# Patient Record
Sex: FEMALE | Race: OTHER | URBAN - METROPOLITAN AREA
[De-identification: names, ages, dates, MRNs, and addresses within clinical notes are randomized per-mention and may not be internally consistent; named-entity substitution may affect disease eponyms.]

---

## 2022-03-23 RX ORDER — DONEPEZIL HYDROCHLORIDE 10 MG/1
10 TABLET, FILM COATED ORAL NIGHTLY
COMMUNITY

## 2022-03-23 RX ORDER — MONTELUKAST SODIUM 5 MG/1
5 TABLET, CHEWABLE ORAL NIGHTLY
COMMUNITY

## 2022-03-23 RX ORDER — CARVEDILOL 25 MG/1
25 TABLET ORAL 2 TIMES DAILY WITH MEALS
COMMUNITY

## 2022-03-23 RX ORDER — AMLODIPINE BESYLATE 5 MG/1
5 TABLET ORAL DAILY
COMMUNITY
End: 2022-08-11

## 2022-03-25 ENCOUNTER — OFFICE VISIT (OUTPATIENT)
Dept: GERIATRIC MEDICINE | Age: 82
End: 2022-03-25
Payer: MEDICARE

## 2022-03-25 DIAGNOSIS — I48.0 PAROXYSMAL ATRIAL FIBRILLATION (HCC): ICD-10-CM

## 2022-03-25 DIAGNOSIS — R53.1 WEAKNESS: ICD-10-CM

## 2022-03-25 DIAGNOSIS — E11.9 DIABETES MELLITUS WITHOUT COMPLICATION (HCC): ICD-10-CM

## 2022-03-25 DIAGNOSIS — C64.1 MALIGNANT NEOPLASM OF RIGHT KIDNEY (HCC): ICD-10-CM

## 2022-03-25 DIAGNOSIS — F01.50 VASCULAR DEMENTIA WITHOUT BEHAVIORAL DISTURBANCE (HCC): Primary | ICD-10-CM

## 2022-03-28 LAB
ANION GAP SERPL CALCULATED.3IONS-SCNC: 14 MEQ/L (ref 9–15)
BASOPHILS ABSOLUTE: 0 K/UL (ref 0–0.2)
BASOPHILS RELATIVE PERCENT: 0.5 %
BUN BLDV-MCNC: 18 MG/DL (ref 8–23)
CALCIUM SERPL-MCNC: 9.6 MG/DL (ref 8.5–9.9)
CHLORIDE BLD-SCNC: 105 MEQ/L (ref 95–107)
CO2: 20 MEQ/L (ref 20–31)
CREAT SERPL-MCNC: 0.92 MG/DL (ref 0.5–0.9)
EOSINOPHILS ABSOLUTE: 0 K/UL (ref 0–0.7)
EOSINOPHILS RELATIVE PERCENT: 0.8 %
GFR AFRICAN AMERICAN: >60
GFR NON-AFRICAN AMERICAN: 58.8
GLUCOSE BLD-MCNC: 101 MG/DL (ref 70–99)
HCT VFR BLD CALC: 44.4 % (ref 37–47)
HEMOGLOBIN: 14.9 G/DL (ref 12–16)
LYMPHOCYTES ABSOLUTE: 0.7 K/UL (ref 1–4.8)
LYMPHOCYTES RELATIVE PERCENT: 11.7 %
MCH RBC QN AUTO: 29.2 PG (ref 27–31.3)
MCHC RBC AUTO-ENTMCNC: 33.6 % (ref 33–37)
MCV RBC AUTO: 87.1 FL (ref 82–100)
MONOCYTES ABSOLUTE: 0.6 K/UL (ref 0.2–0.8)
MONOCYTES RELATIVE PERCENT: 10.2 %
NEUTROPHILS ABSOLUTE: 4.4 K/UL (ref 1.4–6.5)
NEUTROPHILS RELATIVE PERCENT: 76.8 %
PDW BLD-RTO: 17.4 % (ref 11.5–14.5)
PLATELET # BLD: 237 K/UL (ref 130–400)
POTASSIUM SERPL-SCNC: 3.7 MEQ/L (ref 3.4–4.9)
RBC # BLD: 5.1 M/UL (ref 4.2–5.4)
SODIUM BLD-SCNC: 139 MEQ/L (ref 135–144)
TSH SERPL DL<=0.05 MIU/L-ACNC: 0.17 UIU/ML (ref 0.44–3.86)
WBC # BLD: 5.7 K/UL (ref 4.8–10.8)

## 2022-03-30 ENCOUNTER — OFFICE VISIT (OUTPATIENT)
Dept: GERIATRIC MEDICINE | Age: 82
End: 2022-03-30
Payer: MEDICARE

## 2022-03-30 DIAGNOSIS — E03.1 CONGENITAL HYPOTHYROIDISM WITHOUT GOITER: ICD-10-CM

## 2022-03-30 DIAGNOSIS — M81.0 AGE-RELATED OSTEOPOROSIS WITHOUT CURRENT PATHOLOGICAL FRACTURE: ICD-10-CM

## 2022-03-30 DIAGNOSIS — C64.1 MALIGNANT NEOPLASM OF RIGHT KIDNEY (HCC): ICD-10-CM

## 2022-03-30 DIAGNOSIS — F03.90 DEMENTIA WITHOUT BEHAVIORAL DISTURBANCE, UNSPECIFIED DEMENTIA TYPE: Primary | ICD-10-CM

## 2022-03-30 DIAGNOSIS — E66.9 OBESITY (BMI 30.0-34.9): ICD-10-CM

## 2022-03-30 DIAGNOSIS — I48.0 PAROXYSMAL ATRIAL FIBRILLATION (HCC): ICD-10-CM

## 2022-03-30 DIAGNOSIS — G47.33 OSA (OBSTRUCTIVE SLEEP APNEA): ICD-10-CM

## 2022-03-30 DIAGNOSIS — E78.5 HYPERLIPIDEMIA, UNSPECIFIED HYPERLIPIDEMIA TYPE: ICD-10-CM

## 2022-03-30 DIAGNOSIS — K21.9 GASTROESOPHAGEAL REFLUX DISEASE, UNSPECIFIED WHETHER ESOPHAGITIS PRESENT: ICD-10-CM

## 2022-04-01 LAB
T3 TOTAL: 95 NG/DL (ref 60–181)
T4 TOTAL: 10.3 UG/DL (ref 4.5–10.9)

## 2022-04-11 VITALS
WEIGHT: 222 LBS | TEMPERATURE: 96.9 F | HEART RATE: 90 BPM | SYSTOLIC BLOOD PRESSURE: 166 MMHG | HEIGHT: 67 IN | RESPIRATION RATE: 19 BRPM | DIASTOLIC BLOOD PRESSURE: 87 MMHG | BODY MASS INDEX: 34.84 KG/M2 | OXYGEN SATURATION: 99 %

## 2022-04-11 PROBLEM — K21.9 GASTROESOPHAGEAL REFLUX DISEASE: Status: ACTIVE | Noted: 2022-04-11

## 2022-04-11 PROBLEM — I48.0 PAROXYSMAL ATRIAL FIBRILLATION (HCC): Status: ACTIVE | Noted: 2022-04-11

## 2022-04-11 PROBLEM — F03.90 DEMENTIA WITHOUT BEHAVIORAL DISTURBANCE (HCC): Status: ACTIVE | Noted: 2022-04-11

## 2022-04-11 PROBLEM — C64.1 MALIGNANT NEOPLASM OF RIGHT KIDNEY (HCC): Status: ACTIVE | Noted: 2022-04-11

## 2022-04-11 PROBLEM — E78.5 HYPERLIPIDEMIA: Status: ACTIVE | Noted: 2022-04-11

## 2022-04-11 PROBLEM — E66.9 OBESITY (BMI 30.0-34.9): Status: ACTIVE | Noted: 2022-04-11

## 2022-04-11 PROBLEM — G47.33 OSA (OBSTRUCTIVE SLEEP APNEA): Status: ACTIVE | Noted: 2022-04-11

## 2022-04-11 PROBLEM — E03.1 CONGENITAL HYPOTHYROIDISM WITHOUT GOITER: Status: ACTIVE | Noted: 2022-04-11

## 2022-04-11 PROBLEM — M81.0 AGE-RELATED OSTEOPOROSIS WITHOUT CURRENT PATHOLOGICAL FRACTURE: Status: ACTIVE | Noted: 2022-04-11

## 2022-04-12 NOTE — PROGRESS NOTES
Patient Name: Tamera Lawler  YOB: 1940  Medical Record Number: [id number]      History of Present Illness: This 77-year-old woman was seen for follow-up visit patient has history dementia with agitation. Patient has status post behaviors in the past.  Patient has not recent nausea vomiting patient transferred to our service functionally declining. Patient has history afibrillation diabetes degenerative disease. Patient history of hypothyroid patient has not recent nausea vomiting. Patient history of MONY clinically stable. Patient in acute psychosis. No new acute her bowel bladder habits. Also has been intimately constipated per nursing staff. Review of Systems   Unable to perform ROS: Dementia   All other systems reviewed and are negative. Review of Systems: All 14 review of systems negative other than as stated above    Social History     Tobacco Use    Smoking status: Not on file    Smokeless tobacco: Not on file   Substance Use Topics    Alcohol use: Not on file    Drug use: Not on file         No past medical history on file. No past surgical history on file. Current Outpatient Medications on File Prior to Visit   Medication Sig Dispense Refill    carvedilol (COREG) 25 MG tablet Take 25 mg by mouth 2 times daily (with meals) Indications: High Blood Pressure Disorder      donepezil (ARICEPT) 10 MG tablet Take 10 mg by mouth nightly Indications: Decline in Cognition due to a Brain Disease      apixaban (ELIQUIS) 5 MG TABS tablet Take 5 mg by mouth 2 times daily Indications: Paroxysmal Atrial Fibrillation      SITagliptin (JANUVIA) 100 MG tablet Take 100 mg by mouth daily Indications: Diabetes      amLODIPine (NORVASC) 5 MG tablet Take 5 mg by mouth daily Indications: High Blood Pressure Disorder      montelukast (SINGULAIR) 5 MG chewable tablet Take 5 mg by mouth nightly Indications:  Allergy      cyanocobalamin 1000 MCG tablet Take 1,000 mcg by mouth daily Indications: Nutritional Support       No current facility-administered medications on file prior to visit. No Known Allergies      No family history on file. Physical Exam:      Physical Exam  Vitals and nursing note reviewed. Constitutional:       Appearance: Normal appearance. She is normal weight. HENT:      Nose: Nose normal.      Mouth/Throat:      Mouth: Mucous membranes are moist.   Eyes:      Pupils: Pupils are equal, round, and reactive to light. Cardiovascular:      Rate and Rhythm: Normal rate and regular rhythm. Pulses: Normal pulses. Pulmonary:      Effort: Pulmonary effort is normal.      Breath sounds: No rhonchi. Abdominal:      General: Bowel sounds are normal.      Palpations: Abdomen is soft. Tenderness: There is no abdominal tenderness. Musculoskeletal:         General: Tenderness present. Normal range of motion. Cervical back: Normal range of motion and neck supple. Skin:     General: Skin is warm. Findings: Bruising present. Neurological:      General: No focal deficit present. Mental Status: Mental status is at baseline. Psychiatric:         Mood and Affect: Mood normal.         There were no vitals taken for this visit. .   Lab Results   Component Value Date    WBC 5.7 03/28/2022    HGB 14.9 03/28/2022    HCT 44.4 03/28/2022    MCV 87.1 03/28/2022     03/28/2022     Lab Results   Component Value Date     03/28/2022    K 3.7 03/28/2022     03/28/2022    CO2 20 03/28/2022    BUN 18 03/28/2022    CREATININE 0.92 03/28/2022    GLUCOSE 101 03/28/2022    CALCIUM 9.6 03/28/2022                ASSESSMENT:  Patient Active Problem List   Diagnosis    Dementia without behavioral disturbance (HCC)    Paroxysmal atrial fibrillation (HCC)    Malignant neoplasm of right kidney (HCC)    MONY (obstructive sleep apnea)    Obesity (BMI 30.0-34. 9)    Age-related osteoporosis without current pathological fracture    Congenital hypothyroidism without goiter    Hyperlipidemia    Gastroesophageal reflux disease         PLAN:   Diagnosis Orders   1. Vascular dementia without behavioral disturbance (HCC)     2. Paroxysmal atrial fibrillation ([facility]     3. Malignant neoplasm of right kidney ([address]     4. Weakness     5. Diabetes mellitus without complication ([facility] [address]         Continue with donepezil. Consider with Namenda. Patient has been stable has been on a anticoagulation is on beta-blocker is on Eliquis, no new bleeding\continue reorientation as able. May conside attritional reassessment. Follow-up A1c CBC. Pending.

## 2022-04-12 NOTE — PROGRESS NOTES
[facility]    Assisted living apartment    Subjective:  Admission H & P    3/30/2022    Patient ID: Mervin Lugo is a 80 y.o. female being seen today for:   Chief Complaint   Patient presents with    Other     New admissionH&P       HPI patient being seen today for follow-up after recent admission to this facility with primary diagnosis of dementia without behaviors, proximal A. fib, malignant neoplasm right kidney, MONY, obesity, age-related osteoporosis without current pathological fracture, congenital hypothyroidism, hyperlipidemia and GERD. Needs assist/cues with decisions  Needs assist with ADLs  Needs assist with IADLs  + Falls risk    No past medical history on file. No past surgical history on file. No family history on file. Social History     Socioeconomic History    Marital status:      Spouse name: Not on file    Number of children: Not on file    Years of education: Not on file    Highest education level: Not on file   Occupational History    Not on file   Tobacco Use    Smoking status: Not on file    Smokeless tobacco: Not on file   Substance and Sexual Activity    Alcohol use: Not on file    Drug use: Not on file    Sexual activity: Not on file   Other Topics Concern    Not on file   Social History Narrative    Not on file     Social Determinants of Health     Financial Resource Strain:     Difficulty of Paying Living Expenses: Not on file   Food Insecurity:     Worried About Running Out of Food in the Last Year: Not on file    Stephany of Food in the Last Year: Not on file   Transportation Needs:     Lack of Transportation (Medical): Not on file    Lack of Transportation (Non-Medical):  Not on file   Physical Activity:     Days of Exercise per Week: Not on file    Minutes of Exercise per Session: Not on file   Stress:     Feeling of Stress : Not on file   Social Connections:     Frequency of Communication with Friends and Family: Not on file  Frequency of Social Gatherings with Friends and Family: Not on file    Attends Tenriism Services: Not on file    Active Member of Clubs or Organizations: Not on file    Attends Club or Organization Meetings: Not on file    Marital Status: Not on file   Intimate Partner Violence:     Fear of Current or Ex-Partner: Not on file    Emotionally Abused: Not on file    Physically Abused: Not on file    Sexually Abused: Not on file   Housing Stability:     Unable to Pay for Housing in the Last Year: Not on file    Number of [place] in the Last Year: Not on file    Unstable Housing in the Last Year: Not on file       Allergies: Patient has no known allergies. Current Outpatient Medications on File Prior to Visit   Medication Sig Dispense Refill    carvedilol (COREG) 25 MG tablet Take 25 mg by mouth 2 times daily (with meals) Indications: High Blood Pressure Disorder      donepezil (ARICEPT) 10 MG tablet Take 10 mg by mouth nightly Indications: Decline in Cognition due to a Brain Disease      apixaban (ELIQUIS) 5 MG TABS tablet Take 5 mg by mouth 2 times daily Indications: Paroxysmal Atrial Fibrillation      SITagliptin (JANUVIA) 100 MG tablet Take 100 mg by mouth daily Indications: Diabetes      amLODIPine (NORVASC) 5 MG tablet Take 5 mg by mouth daily Indications: High Blood Pressure Disorder      montelukast (SINGULAIR) 5 MG chewable tablet Take 5 mg by mouth nightly Indications: Allergy      cyanocobalamin 1000 MCG tablet Take 1,000 mcg by mouth daily Indications: Nutritional Support       No current facility-administered medications on file prior to visit. Review of Systems  Denies any headache, dizziness, blurred vision. Denies chest pain, shortness of breath, cough. Denies fever, chills, or shortness of breath. Denies rashes, bruises or open areas. Denies abdominal pain, nausea, vomiting. Denies urinary urgency ,frequency or burning. Denies diarrhea or constipation.   Reports she is eating well, sleeping well, and currently has no pain.  :   BP (!) 166/87   Pulse 90   Temp 96.9 °F (36.1 °C)   Resp 19   Ht 5' 7\" (1.702 m)   Wt 222 lb (100.7 kg)   SpO2 99%   BMI 34.77 kg/m²     Physical Exam  Constitutional: Alert elderly female no apparent distress. HEENT: Normocephalic, atraumatic, conjunctiva pink, sclera nonicteric. External ears within normal limits. Slightly hard of hearing. MMM, pink, no oropharyngeal exudate. Neck: Supple. No cervical or clavicular lymphadenopathy. No masses noted. Respiratory: LS CTA. Respirations even and unlabored. No use of accessory muscles with breathing. Cardiovascular: Heart RRR, no MGR. Abdomen: Soft, NT, ND, + BS. No masses noted. Musculoskeletal: No muscle pain. No joint tenderness. Peripheral vascular: PPP, NP edema BLE  Integumentary: Pink, warm, dry. No visible rashes, bruises or open areas. Neuropsych: Mood stable. Affect pleasant. Assessment:       Diagnosis Orders   1. Dementia without behavioral disturbance, unspecified dementia type (HCC)     2. Paroxysmal atrial fibrillation ([address]     3. Malignant neoplasm of right kidney ([address]     4. MONY (obstructive sleep apnea)     5. Obesity (BMI 30.0-34.9)     6. Age-related osteoporosis without current pathological fracture     7. Congenital hypothyroidism without goiter     8. Hyperlipidemia, unspecified hyperlipidemia type     9. Gastroesophageal reflux disease, unspecified whether esophagitis present           Plan:       Return in about 3 months (around 6/30/2022) for chronic conditions. Pt/POA agrees to POC    Pertinent POC, medications, labs, have been reviewed, continue same. Encourage fluids and good nutrition. Stress fall prevention strategies. Chapo Padgett.  Ian MSN, APRN, CNP  Adult Nurse Practitioner  [facility]  Department of Geriatrics  8:30am-4:30pm   [phone]  After hours Answering service [phone]      Please note this report is partially produced by using speech recognition hardware. It may contain errors related to the system, including grammar, punctuation and spelling as well as words and phrases that may seem inaccurate.   For any questions or concerns feel free to contact me for clarification

## 2022-04-21 LAB
BILIRUBIN URINE: NEGATIVE
BLOOD, URINE: NEGATIVE
CLARITY: CLEAR
COLOR: YELLOW
GLUCOSE URINE: NEGATIVE MG/DL
KETONES, URINE: NEGATIVE MG/DL
LEUKOCYTE ESTERASE, URINE: NEGATIVE
NITRITE, URINE: NEGATIVE
PH UA: 5.5 (ref 5–9)
PROTEIN UA: ABNORMAL MG/DL
SPECIFIC GRAVITY UA: 1.02 (ref 1–1.03)
UROBILINOGEN, URINE: 1 E.U./DL

## 2022-04-22 LAB — URINE CULTURE, ROUTINE: NORMAL

## 2022-04-28 ENCOUNTER — OFFICE VISIT (OUTPATIENT)
Dept: GERIATRIC MEDICINE | Age: 82
End: 2022-04-28
Payer: MEDICARE

## 2022-04-28 DIAGNOSIS — K43.9 ABDOMINAL WALL HERNIA: Primary | ICD-10-CM

## 2022-05-19 ENCOUNTER — OFFICE VISIT (OUTPATIENT)
Dept: GERIATRIC MEDICINE | Age: 82
End: 2022-05-19
Payer: MEDICARE

## 2022-05-19 DIAGNOSIS — I48.0 PAROXYSMAL ATRIAL FIBRILLATION (HCC): ICD-10-CM
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
## 2022-05-19 PROCEDURE — 1123F ACP DISCUSS/DSCN MKR DOCD: CPT | Performed by: NURSE PRACTITIONER

## 2022-05-21 PROBLEM — K43.9 ABDOMINAL WALL HERNIA: Status: ACTIVE | Noted: 2022-05-21

## 2022-05-21 NOTE — PROGRESS NOTES
[facility]  4/28/2022    Rao Douglass  is a 80 y.o. in the  being seen for a acute visit for   Chief Complaint   Patient presents with    Abdominal Pain       HPI Patient being seen today for acute visit for abdominal wall lump that causes her occasional pain. They are eating and drinking at their baseline per nursing. They have had no recent falls with injuries. They are not complaining of any un addressed pain issues. Have had no recent choking or dysphagia issues. NO agitation or unusual mental behavioral issues. No past medical history on file. No past surgical history on file. No family history on file. Social History     Socioeconomic History    Marital status:      Spouse name: Not on file    Number of children: Not on file    Years of education: Not on file    Highest education level: Not on file   Occupational History    Not on file   Tobacco Use    Smoking status: Not on file    Smokeless tobacco: Not on file   Substance and Sexual Activity    Alcohol use: Not on file    Drug use: Not on file    Sexual activity: Not on file   Other Topics Concern    Not on file   Social History Narrative    Not on file     Social Determinants of Health     Financial Resource Strain:     Difficulty of Paying Living Expenses: Not on file   Food Insecurity:     Worried About Running Out of Food in the Last Year: Not on file    Stephany of Food in the Last Year: Not on file   Transportation Needs:     Lack of Transportation (Medical): Not on file    Lack of Transportation (Non-Medical):  Not on file   Physical Activity:     Days of Exercise per Week: Not on file    Minutes of Exercise per Session: Not on file   Stress:     Feeling of Stress : Not on file   Social Connections:     Frequency of Communication with Friends and Family: Not on file    Frequency of Social Gatherings with Friends and Family: Not on file    Attends Caodaism Services: Not on file   [employer] Active Member of Clubs or Organizations: Not on file    Attends Club or Organization Meetings: Not on file    Marital Status: Not on file   Intimate Partner Violence:     Fear of Current or Ex-Partner: Not on file    Emotionally Abused: Not on file    Physically Abused: Not on file    Sexually Abused: Not on file   Housing Stability:     Unable to Pay for Housing in the Last Year: Not on file    Number of [place] in the Last Year: Not on file    Unstable Housing in the Last Year: Not on file       Allergies: Patient has no known allergies. NF MEDICATIONS REVIEWED    ROS:   Constitutional: There are no reports of behavioral issues, change in appetite, fever, or increased weakness. No bleeding issues. Respiratory: denies SOB, dyspnea, dyspnea on exertion  Cardiovascular: denies CP, lightheadedness, palpitations, PND, orthopnea, or claudication. GI: No N/V/D. Complains of abdominal mass right abdominal wall. Reports she has had for a very long time. Reports occasional pain in this area. : no reports of dysuria, frequency or urgency  Extremities: No reports of pain issues, edema  Psych: at baseline confusion     Physical exam:   /85, temperature 97.4, heart rate 78, respirations 18, pulse ox 99% room air, weight 222 pounds. Constitutional: Awake and alert, in no apparent distress  HEENT: Normocephalic, hard of hearing,intact facial symmetry, conjunctiva pink, sclera non icteric,  buccal mucosa pink and moist  Speech clear. NECK: - no cervical or clavicular lymphadenopathy, euthyroid, no mass visualized  Cardiovascular: Regular rate, and rhythm. No murmurs, gallops or rubs noted. Respiratory: LCTA, even unlabored respirations, no accessory muscle use noted  GI: abdomen NT, +BS x 4 Q, ND. Abdominal wall hernia, soft, nontender, no erythema. : incontinent of urine  Extremities: no ankle edema, PPP  SKELETAL: no redness, or swelling over joints.  Limited ROM but spontaneous movement x 4   Psych: pleasantly confused, repetitive , needs occasional redirection, good judgement, normal thought content  SKIN: no gross skin lesions noted on visualized skin, warm and dry    ASSESSMENT:     Diagnosis Orders   1. Abdominal wall hernia         PLAN:  Pt/POA agrees with POC   Abdominal ultrasound in the a.m. Please note this report is partially produced by using speech recognition hardware. It may contain errors related to the system, including grammar, punctuation and spelling as well as words and phrases that may seem inaccurate. For any questions or concerns feel free to contact me for clarification           ________________________    Kareem Troy.  Nirmal Lemus Banner, [address], [address] [place] [address]  Office [phone]  Fax [phone]
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
## 2022-05-31 ENCOUNTER — OFFICE VISIT (OUTPATIENT)
Dept: GERIATRIC MEDICINE | Age: 82
End: 2022-05-31
Payer: MEDICARE

## 2022-05-31 DIAGNOSIS — R60.0 EDEMA OF BOTH LOWER EXTREMITIES: Primary | ICD-10-CM

## 2022-05-31 PROCEDURE — 1123F ACP DISCUSS/DSCN MKR DOCD: CPT | Performed by: NURSE PRACTITIONER

## 2022-05-31 NOTE — PROGRESS NOTES
[facility]  5/19/2022    Valeria Ambriz  is a 80 y.o. in the  being seen for a acute visit for   Chief Complaint   Patient presents with    Fall     5/13/22 with no injury    Atrial Fibrillation       HPI patient being seen today for acute visit for fall on 5/13/2022 with no injury and A. fib. Patient is complaining of heart palpitations and is concerned stating that this is new and she can feel them. She does not believe she has a history of A. fib but she states she is never felt her heart do this in the past.  Patient reports that she slid out of bed and this is what caused her fall. She denies hitting her head she denies any injuries as a result of the fall she states she is fine. They are eating and drinking at their baseline per nursing. They are not complaining of any un addressed pain issues. Have had no recent choking or dysphagia issues. NO agitation or unusual mental behavioral issues. No past medical history on file. No past surgical history on file. No family history on file. Social History     Socioeconomic History    Marital status:      Spouse name: Not on file    Number of children: Not on file    Years of education: Not on file    Highest education level: Not on file   Occupational History    Not on file   Tobacco Use    Smoking status: Not on file    Smokeless tobacco: Not on file   Substance and Sexual Activity    Alcohol use: Not on file    Drug use: Not on file    Sexual activity: Not on file   Other Topics Concern    Not on file   Social History Narrative    Not on file     Social Determinants of Health     Financial Resource Strain:     Difficulty of Paying Living Expenses: Not on file   Food Insecurity:     Worried About Running Out of Food in the Last Year: Not on file    Stephany of Food in the Last Year: Not on file   Transportation Needs:     Lack of Transportation (Medical):  Not on file    Lack of Transportation (Non-Medical): Not on file   Physical Activity:     Days of Exercise per Week: Not on file    Minutes of Exercise per Session: Not on file   Stress:     Feeling of Stress : Not on file   Social Connections:     Frequency of Communication with Friends and Family: Not on file    Frequency of Social Gatherings with Friends and Family: Not on file    Attends Synagogue Services: Not on file    Active Member of [address] [place] [address] or Organizations: Not on file    Attends Club or Organization Meetings: Not on file    Marital Status: Not on file   Intimate Partner Violence:     Fear of Current or Ex-Partner: Not on file    Emotionally Abused: Not on file    Physically Abused: Not on file    Sexually Abused: Not on file   Housing Stability:     Unable to Pay for Housing in the Last Year: Not on file    Number of [place] in the Last Year: Not on file    Unstable Housing in the Last Year: Not on file       Allergies: Patient has no known allergies. NF MEDICATIONS REVIEWED    ROS:   Constitutional: There are no reports of behavioral issues, change in appetite, fever, or increased weakness. No bleeding issues. Respiratory: denies SOB, dyspnea, dyspnea on exertion  Cardiovascular: denies CP, lightheadedness. Reports heart palpitations which she states she has never had before. No PND, orthopnea, or claudication. GI: No N/V/D. : no reports of dysuria, frequency or urgency  Extremities: No reports of pain issues, edema  Psych: at baseline confusion     Physical exam:   Blood pressure 141/78, heart rate 68 and regular, respirations 19. Constitutional: Awake and alert sitting up in recliner in common area, general weakness  HEENT: Normocephalic, hard of hearing,intact facial symmetry, conjunctiva pink, sclera non icteric,  buccal mucosa pink and moist  Speech clear. NECK: - no cervical or clavicular lymphadenopathy, euthyroid, no mass visualized  Cardiovascular: Regular rate, and rhythm.  No murmurs, gallops or rubs noted.  Respiratory: LCTA, even unlabored respirations, no accessory muscle use noted  GI: abdomen NT, +BS x 4 Q, ND  : incontinent of urine  Extremities: no ankle edema, PPP  SKELETAL: no redness, or swelling over joints. Limited ROM but spontaneous movement x 4   Psych: pleasantly confused, good judgement, normal thought content  SKIN: no gross skin lesions noted on visualized skin, warm and dry    ASSESSMENT:     Diagnosis Orders   1. Fall, initial encounter     2. Paroxysmal atrial fibrillation (HCC)         PLAN:  Pt/POA agrees with POC   Patient has not experienced an injury as a result of his fall. She did slide out of bed. She denies hitting her head, or sustaining an injury as a result of her fall. Nursing staff report neurochecks are within normal limits and vital signs are stable. Patient is complaining of heart palpitations which she reports she has never had in the past.  She does not believe that she has A. fib that she can recall. EKG in a.m. Patient does have a history of A. fib. However patient reports she has never felt heart palpitations in the past and they scare her. I have advised the patient to report her heart palpitations to nursing staff next time they occur. Please note this report is partially produced by using speech recognition hardware. It may contain errors related to the system, including grammar, punctuation and spelling as well as words and phrases that may seem inaccurate. For any questions or concerns feel free to contact me for clarification         ________________________    Chapo Joseph Current APRN, [address]  Office [phone]  Fax [phone]

## 2022-06-03 LAB
ANION GAP SERPL CALCULATED.3IONS-SCNC: 12 MEQ/L (ref 9–15)
BUN BLDV-MCNC: 15 MG/DL (ref 8–23)
CALCIUM SERPL-MCNC: 8.5 MG/DL (ref 8.5–9.9)
CHLORIDE BLD-SCNC: 103 MEQ/L (ref 95–107)
CO2: 22 MEQ/L (ref 20–31)
CREAT SERPL-MCNC: 0.81 MG/DL (ref 0.5–0.9)
GFR AFRICAN AMERICAN: >60
GFR NON-AFRICAN AMERICAN: >60
GLUCOSE BLD-MCNC: 152 MG/DL (ref 70–99)
POTASSIUM SERPL-SCNC: 4.2 MEQ/L (ref 3.4–4.9)
SODIUM BLD-SCNC: 137 MEQ/L (ref 135–144)

## 2022-06-10 PROBLEM — R60.0 EDEMA OF BOTH LOWER EXTREMITIES: Status: ACTIVE | Noted: 2022-06-10

## 2022-06-10 NOTE — PROGRESS NOTES
[facility]  5/31/2022    Rao Douglass  is a 80 y.o. in the  being seen for a acute visit for   Chief Complaint   Patient presents with    Leg Swelling       HPI Patient being seen today for edema bilateral lower extremities. They are eating and drinking at their baseline per nursing. They have had no recent falls with injuries. They are not complaining of any un addressed pain issues. Have had no recent choking or dysphagia issues. NO agitation or unusual mental behavioral issues. No past medical history on file. No past surgical history on file. No family history on file. Social History     Socioeconomic History    Marital status:      Spouse name: Not on file    Number of children: Not on file    Years of education: Not on file    Highest education level: Not on file   Occupational History    Not on file   Tobacco Use    Smoking status: Not on file    Smokeless tobacco: Not on file   Substance and Sexual Activity    Alcohol use: Not on file    Drug use: Not on file    Sexual activity: Not on file   Other Topics Concern    Not on file   Social History Narrative    Not on file     Social Determinants of Health     Financial Resource Strain:     Difficulty of Paying Living Expenses: Not on file   Food Insecurity:     Worried About Running Out of Food in the Last Year: Not on file    Stephany of Food in the Last Year: Not on file   Transportation Needs:     Lack of Transportation (Medical): Not on file    Lack of Transportation (Non-Medical):  Not on file   Physical Activity:     Days of Exercise per Week: Not on file    Minutes of Exercise per Session: Not on file   Stress:     Feeling of Stress : Not on file   Social Connections:     Frequency of Communication with Friends and Family: Not on file    Frequency of Social Gatherings with Friends and Family: Not on file    Attends Church Services: Not on file    Active Member of Clubs or Organizations: Diagnosis Orders   1. Edema of both lower extremities         PLAN:  Pt/POA agrees with POC   Lasix 20mg po qd x5 days  Elevate legs when seated  Noncompliant with FLOWER hose  Tubigrips, knee high, on in the am off in the pm    Please note this report is partially produced by using speech recognition hardware. It may contain errors related to the system, including grammar, punctuation and spelling as well as words and phrases that may seem inaccurate. For any questions or concerns feel free to contact me for clarification         ________________________    Aiyana Alexander.  Parker Goldsmith [facility], [address], [address] [place] [address]  Office [phone]  Fax [phone]

## 2022-06-20 LAB
ANION GAP SERPL CALCULATED.3IONS-SCNC: 10 MEQ/L (ref 9–15)
BUN BLDV-MCNC: 8 MG/DL (ref 8–23)
CALCIUM SERPL-MCNC: 8.9 MG/DL (ref 8.5–9.9)
CHLORIDE BLD-SCNC: 102 MEQ/L (ref 95–107)
CO2: 24 MEQ/L (ref 20–31)
CREAT SERPL-MCNC: 0.83 MG/DL (ref 0.5–0.9)
GFR AFRICAN AMERICAN: >60
GFR NON-AFRICAN AMERICAN: >60
GLUCOSE BLD-MCNC: 92 MG/DL (ref 70–99)
POTASSIUM SERPL-SCNC: 3.7 MEQ/L (ref 3.4–4.9)
SODIUM BLD-SCNC: 136 MEQ/L (ref 135–144)
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
## 2022-07-05 ENCOUNTER — OFFICE VISIT (OUTPATIENT)
Dept: SURGERY | Age: 82
End: 2022-07-05
Payer: MEDICARE

## 2022-07-05 VITALS
HEIGHT: 67 IN | HEART RATE: 84 BPM | WEIGHT: 222 LBS | BODY MASS INDEX: 34.84 KG/M2 | OXYGEN SATURATION: 97 % | TEMPERATURE: 97.4 F

## 2022-07-05 DIAGNOSIS — K43.9 VENTRAL HERNIA WITHOUT OBSTRUCTION OR GANGRENE: Primary | ICD-10-CM

## 2022-07-05 PROCEDURE — 99203 OFFICE O/P NEW LOW 30 MIN: CPT | Performed by: COLON & RECTAL SURGERY

## 2022-07-05 PROCEDURE — 1123F ACP DISCUSS/DSCN MKR DOCD: CPT | Performed by: COLON & RECTAL SURGERY

## 2022-07-05 ASSESSMENT — ENCOUNTER SYMPTOMS
VOMITING: 0
COLOR CHANGE: 0
SHORTNESS OF BREATH: 0
CONSTIPATION: 0
ABDOMINAL PAIN: 0
DIARRHEA: 0

## 2022-07-05 NOTE — PROGRESS NOTES
Subjective:      Patient ID: Anita Opitz is a 80 y.o. female who presents for:  Chief Complaint   Patient presents with    Hernia       This is a 30-year-old female with underlying dementia who is here for an abdominal wall hernia. She is currently a resident at [facility] with her . She complains of an asymptomatic bulge in the right upper quadrant. She had a right upper quadrant ultrasound which I reviewed which showed a hernia present. Her son is present with parents for further elaboration. Patient denies any abdominal operations    She denies nausea vomiting. She reports her bowel function is good and she denies fever. History reviewed. No pertinent past medical history. History reviewed. No pertinent surgical history. Social History     Socioeconomic History    Marital status:      Spouse name: Not on file    Number of children: Not on file    Years of education: Not on file    Highest education level: Not on file   Occupational History    Not on file   Tobacco Use    Smoking status: Former Smoker    Smokeless tobacco: Former User   Substance and Sexual Activity    Alcohol use: Not Currently    Drug use: Not on file    Sexual activity: Not on file   Other Topics Concern    Not on file   Social History Narrative    Not on file     Social Determinants of Health     Financial Resource Strain:     Difficulty of Paying Living Expenses: Not on file   Food Insecurity:     Worried About [address] in the Last Year: Not on file    [address] in the Last Year: Not on file   Transportation Needs:     Lack of Transportation (Medical): Not on file    Lack of Transportation (Non-Medical):  Not on file   Physical Activity:     Days of Exercise per Week: Not on file    Minutes of Exercise per Session: Not on file   Stress:     Feeling of Stress : Not on file   Social Connections:     Frequency of Communication with Friends and Family: Not on file  Frequency of Social Gatherings with Friends and Family: Not on file    Attends Cheondo[employer] Services: Not on file    Active Member of Clubs or Organizations: Not on file    Attends Club or Organization Meetings: Not on file    Marital Status: Not on file   Intimate Partner Violence:     Fear of Current or Ex-Partner: Not on file    Emotionally Abused: Not on file    Physically Abused: Not on file    Sexually Abused: Not on file   Housing Stability:     Unable to Pay for Housing in the Last Year: Not on file    Number of [place] in the Last Year: Not on file    Unstable Housing in the Last Year: Not on file     History reviewed. No pertinent family history. Allergies:  Patient has no known allergies. Review of Systems   Constitutional: Positive for activity change. Negative for fever and unexpected weight change. HENT: Negative for congestion. Respiratory: Negative for shortness of breath. Cardiovascular: Negative for chest pain. Gastrointestinal: Negative for abdominal pain, constipation, diarrhea and vomiting. Genitourinary: Negative for difficulty urinating. Musculoskeletal: Positive for gait problem. Negative for arthralgias. Skin: Negative for color change. Neurological: Negative for dizziness. Hematological: Does not bruise/bleed easily. Psychiatric/Behavioral: Positive for decreased concentration. Negative for agitation. Objective:    Pulse 84   Temp 97.4 °F (36.3 °C) (Temporal)   Ht 5' 7\" (1.702 m)   Wt 222 lb (100.7 kg)   SpO2 97%   BMI 34.77 kg/m²     Physical Exam  Constitutional:       Appearance: She is well-developed. HENT:      Head: Normocephalic and atraumatic. Eyes:      Pupils: Pupils are equal, round, and reactive to light. Cardiovascular:      Rate and Rhythm: Normal rate and regular rhythm. Heart sounds: Normal heart sounds. Pulmonary:      Effort: Pulmonary effort is normal. No respiratory distress.       Breath sounds: Normal breath sounds. No wheezing. Abdominal:      General: There is no distension. Palpations: There is no mass. Tenderness: There is no abdominal tenderness. There is no guarding or rebound. Hernia: A hernia is present. Comments: Reducible hernia right upper quadrant. No skin changes    No pain on reduction   Musculoskeletal:         General: Normal range of motion. Cervical back: Normal range of motion and neck supple. Skin:     General: Skin is warm and dry. Coloration: Skin is not pale. Findings: No erythema or rash. Neurological:      Mental Status: She is alert and oriented to person, place, and time. Psychiatric:         Behavior: Behavior normal.         Judgment: Judgment normal.              Assessment/Plan:          Diagnosis Orders   1. Ventral hernia without obstruction or gangrene       I reviewed the abdominal ultrasound    I discussed the risks and benefits of operative repair of this hernia. Risks of infection, bleeding, recurrence postoperative pain and aggravation or worsening of dementia discussed. Nonoperative alternatives were given because of its asymptomatic nature. The patient as well as the son wish to proceed with nonoperative treatment. If this hernia were to become symptomatic, I be happy to discuss again the risks and benefits of repair. Currently for now, we will proceed with nonoperative treatment. All in agreement. Please note this report has beenpartially produced using speech recognition software and may cause contain errors related to that system including grammar, punctuation and spelling as well as words and phrases that may seem inappropriate.  If there arequestions or concerns please feel free to contact me to clarify

## 2022-07-14 ENCOUNTER — OFFICE VISIT (OUTPATIENT)
Dept: GERIATRIC MEDICINE | Age: 82
End: 2022-07-14
Payer: MEDICARE

## 2022-07-14 VITALS
BODY MASS INDEX: 35.19 KG/M2 | TEMPERATURE: 98.4 F | HEART RATE: 72 BPM | DIASTOLIC BLOOD PRESSURE: 70 MMHG | RESPIRATION RATE: 18 BRPM | OXYGEN SATURATION: 97 % | HEIGHT: 68 IN | SYSTOLIC BLOOD PRESSURE: 134 MMHG | WEIGHT: 232.2 LBS

## 2022-07-14 DIAGNOSIS — Z71.89 ACP (ADVANCE CARE PLANNING): ICD-10-CM

## 2022-07-14 DIAGNOSIS — Z00.00 INITIAL MEDICARE ANNUAL WELLNESS VISIT: Primary | ICD-10-CM

## 2022-07-14 PROCEDURE — G0438 PPPS, INITIAL VISIT: HCPCS | Performed by: NURSE PRACTITIONER

## 2022-07-14 PROCEDURE — 99497 ADVNCD CARE PLAN 30 MIN: CPT | Performed by: NURSE PRACTITIONER

## 2022-07-14 PROCEDURE — 1123F ACP DISCUSS/DSCN MKR DOCD: CPT | Performed by: NURSE PRACTITIONER

## 2022-07-14 ASSESSMENT — PATIENT HEALTH QUESTIONNAIRE - PHQ9
SUM OF ALL RESPONSES TO PHQ QUESTIONS 1-9: 0
SUM OF ALL RESPONSES TO PHQ QUESTIONS 1-9: 0
2. FEELING DOWN, DEPRESSED OR HOPELESS: 0
SUM OF ALL RESPONSES TO PHQ QUESTIONS 1-9: 0
1. LITTLE INTEREST OR PLEASURE IN DOING THINGS: 0
SUM OF ALL RESPONSES TO PHQ9 QUESTIONS 1 & 2: 0
SUM OF ALL RESPONSES TO PHQ QUESTIONS 1-9: 0

## 2022-07-14 ASSESSMENT — LIFESTYLE VARIABLES
HOW OFTEN DO YOU HAVE A DRINK CONTAINING ALCOHOL: NEVER
HOW MANY STANDARD DRINKS CONTAINING ALCOHOL DO YOU HAVE ON A TYPICAL DAY: 1 OR 2

## 2022-07-14 NOTE — PROGRESS NOTES
days    Minutes of Exercise per Session: 0 min     Have you lost any weight without trying in the past 3 months?: No  Body mass index: (!) 35.3  Have you seen the dentist within the past year?: (!) No (does not feel the need to see dentist)    Health Habits/Nutrition Interventions:  · Dental exam overdue:  patient declines dental evaluation    Hearing/Vision:  Do you or your family notice any trouble with your hearing that hasn't been managed with hearing aids?: No  Do you have difficulty driving, watching TV, or doing any of your daily activities because of your eyesight?: No  Have you had an eye exam within the past year?: (!) No (would like to see facility eye doctor)  No exam data present    Hearing/Vision Interventions:  · Vision concerns:  ophthalmology/optometry referral provided     ADLs:  In the past 7 days, did you need help from others to perform any of the following everyday activities: Eating, dressing, grooming, bathing, toileting, or walking/balance?: (!) Yes (uses walker for balance)  Select all that apply: (!) Walking/Balance  In the past 7 days, did you need help from others to take care of any of the following: Laundry, housekeeping, banking/finances, shopping, telephone use, food preparation, transportation, or taking medications?: (!) Yes  Select all that apply: (!) Laundry,Housekeeping,Shopping,Banking/Finances,Telephone Use,Food Preparation,Transportation,Taking Medications    ADL Interventions:  · Patient declines any further evaluation/treatment for this issue          Objective   Vitals:    07/14/22 1526   BP: 134/70   Pulse: 72   Resp: 18   Temp: 98.4 °F (36.9 °C)   SpO2: 97%   Weight: 232 lb 3.2 oz (105.3 kg)   Height: 5' 8\" (1.727 m)      Body mass index is 35.31 kg/m².         General Appearance: alert and oriented to person, place and time, well-developed and well-nourished, in no acute distress, well-developed and well nourished, in no acute distress, alert and obese  Skin: warm and dry, no rash or erythema  Head: normocephalic and atraumatic  Eyes: conjunctivae normal and sclera anicteric  ENT: tympanic membrane, external ear and ear canal normal bilaterally, oropharynx clear and moist with normal mucous membranes, hearing grossly normal bilaterally, oropharynx clear and moist with normal mucous membranes and good dentition  Neck: neck supple and non tender without mass, no thyromegaly or thyroid nodules, no cervical lymphadenopathy   Pulmonary/Chest: clear to auscultation bilaterally- no wheezes, rales or rhonchi, normal air movement, no respiratory distress  Cardiovascular: normal rate, normal S1 and S2, no murmurs, no gallops, intact distal pulses and no JVD  Abdomen: soft, non-tender, non-distended, normal bowel sounds, no masses or organomegaly  Extremities: no cyanosis and no edema  Musculoskeletal: normal range of motion, no joint swelling, deformity or tenderness  Neurologic: speech normal, no tremor and gait abnormal- ambulates with wheeled walker. No Known Allergies  Prior to Visit Medications    Medication Sig Taking? Authorizing Provider   carvedilol (COREG) 25 MG tablet Take 25 mg by mouth 2 times daily (with meals) Indications: High Blood Pressure Disorder  Historical Provider, MD   donepezil (ARICEPT) 10 MG tablet Take 10 mg by mouth nightly Indications: Decline in Cognition due to a Brain Disease  Historical Provider, MD   apixaban (ELIQUIS) 5 MG TABS tablet Take 5 mg by mouth 2 times daily Indications: Paroxysmal Atrial Fibrillation  Historical Provider, MD   SITagliptin (JANUVIA) 100 MG tablet Take 100 mg by mouth daily Indications: Diabetes  Historical Provider, MD   amLODIPine (NORVASC) 5 MG tablet Take 5 mg by mouth daily Indications: High Blood Pressure Disorder  Historical Provider, MD   montelukast (SINGULAIR) 5 MG chewable tablet Take 5 mg by mouth nightly Indications:  Allergy  Historical Provider, MD   cyanocobalamin 1000 MCG tablet Take 1,000 mcg by mouth daily Indications: Nutritional Support  Historical Provider, MD [place] (Including outside providers/suppliers regularly involved in providing care):   Patient Care Team:  Melia Vasquez MD as PCP - General (Internal Medicine)  Melia Vasquez MD as PCP - [facility]     Reviewed and updated this visit:  Allergies  Meds  Problems       Return for Medicare Annual Wellness Visit in 1 year. Advance Care Planning   Advanced Care Planning: Discussed the patients choices for care and treatment in case of a health event that adversely affects decision-making abilities. Also discussed the patients long-term treatment options. Reviewed with the patient the appropriate state-specific advance directive documents. Reviewed the process of designating a competent adult as an Agent (or -in-fact) that could take make health care decisions for the patient if incompetent. Patient was asked to complete the declaration forms, either acknowledge the forms by a public notary or an eligible witness and provide a signed copy to the practice office. Time spent (minutes): 30 minutes          ________________________    Alan Phoenix.  [facility], [address]  Office [phone]  Fax [phone]

## 2022-07-27 LAB
ANION GAP SERPL CALCULATED.3IONS-SCNC: 8 MEQ/L (ref 9–15)
BUN BLDV-MCNC: 10 MG/DL (ref 8–23)
CALCIUM SERPL-MCNC: 8.3 MG/DL (ref 8.5–9.9)
CHLORIDE BLD-SCNC: 104 MEQ/L (ref 95–107)
CO2: 25 MEQ/L (ref 20–31)
CREAT SERPL-MCNC: 0.83 MG/DL (ref 0.5–0.9)
GFR AFRICAN AMERICAN: >60
GFR NON-AFRICAN AMERICAN: >60
GLUCOSE BLD-MCNC: 92 MG/DL (ref 70–99)
POTASSIUM SERPL-SCNC: 3.5 MEQ/L (ref 3.4–4.9)
SODIUM BLD-SCNC: 137 MEQ/L (ref 135–144)

## 2022-07-29 ENCOUNTER — OFFICE VISIT (OUTPATIENT)
Dept: GERIATRIC MEDICINE | Age: 82
End: 2022-07-29
Payer: MEDICARE

## 2022-07-29 DIAGNOSIS — R60.0 LOCALIZED EDEMA: Primary | ICD-10-CM

## 2022-07-29 PROCEDURE — 1123F ACP DISCUSS/DSCN MKR DOCD: CPT | Performed by: NURSE PRACTITIONER

## 2022-08-11 ENCOUNTER — OFFICE VISIT (OUTPATIENT)
Dept: GERIATRIC MEDICINE | Age: 82
End: 2022-08-11
Payer: MEDICARE

## 2022-08-11 DIAGNOSIS — I48.91 ATRIAL FIBRILLATION, UNSPECIFIED TYPE (HCC): ICD-10-CM

## 2022-08-11 DIAGNOSIS — C64.1 MALIGNANT NEOPLASM OF RIGHT KIDNEY, EXCEPT RENAL PELVIS (HCC): ICD-10-CM

## 2022-08-11 DIAGNOSIS — N30.00 ACUTE CYSTITIS WITHOUT HEMATURIA: Primary | ICD-10-CM

## 2022-08-11 DIAGNOSIS — E11.9 CONTROLLED TYPE 2 DIABETES MELLITUS WITHOUT COMPLICATION, WITHOUT LONG-TERM CURRENT USE OF INSULIN (HCC): ICD-10-CM

## 2022-08-11 DIAGNOSIS — F01.50 VASCULAR DEMENTIA WITHOUT BEHAVIORAL DISTURBANCE (HCC): ICD-10-CM

## 2022-08-11 PROCEDURE — 1123F ACP DISCUSS/DSCN MKR DOCD: CPT | Performed by: INTERNAL MEDICINE

## 2022-08-11 PROCEDURE — 99305 1ST NF CARE MODERATE MDM 35: CPT | Performed by: INTERNAL MEDICINE

## 2022-08-11 RX ORDER — LANOLIN ALCOHOL/MO/W.PET/CERES
3 CREAM (GRAM) TOPICAL NIGHTLY
COMMUNITY

## 2022-08-11 RX ORDER — SELENIUM 50 MCG
1 TABLET ORAL DAILY
COMMUNITY

## 2022-08-11 RX ORDER — QUETIAPINE FUMARATE 25 MG/1
12.5 TABLET, FILM COATED ORAL DAILY
COMMUNITY

## 2022-08-11 RX ORDER — FUROSEMIDE 20 MG/1
20 TABLET ORAL DAILY
COMMUNITY

## 2022-08-11 RX ORDER — ESCITALOPRAM OXALATE 10 MG/1
10 TABLET ORAL DAILY
COMMUNITY

## 2022-08-11 RX ORDER — HYDROCHLOROTHIAZIDE 12.5 MG/1
12.5 TABLET ORAL DAILY
COMMUNITY
End: 2022-08-22

## 2022-08-11 RX ORDER — MEMANTINE HYDROCHLORIDE 10 MG/1
10 TABLET ORAL 2 TIMES DAILY
COMMUNITY

## 2022-08-11 RX ORDER — ONDANSETRON 4 MG/1
4 TABLET, ORALLY DISINTEGRATING ORAL EVERY 6 HOURS PRN
COMMUNITY

## 2022-08-11 RX ORDER — CEPHALEXIN 500 MG/1
500 CAPSULE ORAL 2 TIMES DAILY
COMMUNITY
End: 2022-08-22

## 2022-08-11 RX ORDER — TELMISARTAN AND HYDROCHLORTHIAZIDE 80; 12.5 MG/1; MG/1
1 TABLET ORAL DAILY
COMMUNITY

## 2022-08-12 ENCOUNTER — OFFICE VISIT (OUTPATIENT)
Dept: GERIATRIC MEDICINE | Age: 82
End: 2022-08-12
Payer: MEDICARE

## 2022-08-12 DIAGNOSIS — D64.9 ANEMIA, UNSPECIFIED TYPE: ICD-10-CM

## 2022-08-12 DIAGNOSIS — E11.9 CONTROLLED TYPE 2 DIABETES MELLITUS WITHOUT COMPLICATION, WITHOUT LONG-TERM CURRENT USE OF INSULIN (HCC): ICD-10-CM

## 2022-08-12 DIAGNOSIS — I10 ESSENTIAL HYPERTENSION: ICD-10-CM

## 2022-08-12 DIAGNOSIS — I48.91 ATRIAL FIBRILLATION, UNSPECIFIED TYPE (HCC): ICD-10-CM

## 2022-08-12 DIAGNOSIS — F01.50 VASCULAR DEMENTIA WITHOUT BEHAVIORAL DISTURBANCE (HCC): Primary | ICD-10-CM
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
## 2022-08-12 DIAGNOSIS — S80.02XA HEMATOMA OF LEFT KNEE REGION: ICD-10-CM

## 2022-08-12 LAB
ANION GAP SERPL CALCULATED.3IONS-SCNC: 12 MEQ/L (ref 9–15)
BASOPHILS ABSOLUTE: 0.1 K/UL (ref 0–0.2)
BASOPHILS RELATIVE PERCENT: 0.5 %
BUN BLDV-MCNC: 14 MG/DL (ref 8–23)
CALCIUM SERPL-MCNC: 8.3 MG/DL (ref 8.5–9.9)
CHLORIDE BLD-SCNC: 100 MEQ/L (ref 95–107)
CHOLESTEROL, TOTAL: 106 MG/DL (ref 0–199)
CO2: 22 MEQ/L (ref 20–31)
CREAT SERPL-MCNC: 0.68 MG/DL (ref 0.5–0.9)
EOSINOPHILS ABSOLUTE: 0.2 K/UL (ref 0–0.7)
EOSINOPHILS RELATIVE PERCENT: 1.4 %
GFR AFRICAN AMERICAN: >60
GFR NON-AFRICAN AMERICAN: >60
GLUCOSE BLD-MCNC: 99 MG/DL (ref 70–99)
HBA1C MFR BLD: 4.4 % (ref 4.8–5.9)
HCT VFR BLD CALC: 26.7 % (ref 37–47)
HDLC SERPL-MCNC: 17 MG/DL (ref 40–59)
HEMOGLOBIN: 8.8 G/DL (ref 12–16)
LDL CHOLESTEROL CALCULATED: 70 MG/DL (ref 0–129)
LYMPHOCYTES ABSOLUTE: 0.6 K/UL (ref 1–4.8)
LYMPHOCYTES RELATIVE PERCENT: 5.2 %
MCH RBC QN AUTO: 30.3 PG (ref 27–31.3)
MCHC RBC AUTO-ENTMCNC: 33 % (ref 33–37)
MCV RBC AUTO: 92 FL (ref 82–100)
MONOCYTES ABSOLUTE: 0.8 K/UL (ref 0.2–0.8)
MONOCYTES RELATIVE PERCENT: 6.6 %
NEUTROPHILS ABSOLUTE: 9.9 K/UL (ref 1.4–6.5)
NEUTROPHILS RELATIVE PERCENT: 86.3 %
PDW BLD-RTO: 17.8 % (ref 11.5–14.5)
PLATELET # BLD: 433 K/UL (ref 130–400)
POTASSIUM SERPL-SCNC: 3.3 MEQ/L (ref 3.4–4.9)
RBC # BLD: 2.9 M/UL (ref 4.2–5.4)
SODIUM BLD-SCNC: 134 MEQ/L (ref 135–144)
TRIGL SERPL-MCNC: 97 MG/DL (ref 0–150)
TSH SERPL DL<=0.05 MIU/L-ACNC: 2.26 UIU/ML (ref 0.44–3.86)
WBC # BLD: 11.5 K/UL (ref 4.8–10.8)

## 2022-08-12 PROCEDURE — 3044F HG A1C LEVEL LT 7.0%: CPT | Performed by: NURSE PRACTITIONER

## 2022-08-12 PROCEDURE — 1123F ACP DISCUSS/DSCN MKR DOCD: CPT | Performed by: NURSE PRACTITIONER

## 2022-08-12 PROCEDURE — 99310 SBSQ NF CARE HIGH MDM 45: CPT | Performed by: NURSE PRACTITIONER

## 2022-08-14 LAB
ALBUMIN SERPL-MCNC: 2.6 G/DL (ref 3.5–4.6)
ALP BLD-CCNC: 63 U/L (ref 40–130)
ALT SERPL-CCNC: 30 U/L (ref 0–33)
AMYLASE: 18 U/L (ref 22–93)
AST SERPL-CCNC: 74 U/L (ref 0–35)
BILIRUB SERPL-MCNC: 7.6 MG/DL (ref 0.2–0.7)
BILIRUBIN DIRECT: 5.6 MG/DL (ref 0–0.4)
BILIRUBIN, INDIRECT: 2 MG/DL (ref 0–0.6)
LIPASE: 16 U/L (ref 12–95)
POTASSIUM SERPL-SCNC: 3.5 MEQ/L (ref 3.4–4.9)
TOTAL PROTEIN: 6.2 G/DL (ref 6.3–8)

## 2022-08-18 PROBLEM — I48.91 UNSPECIFIED ATRIAL FIBRILLATION (HCC): Status: ACTIVE | Noted: 2020-01-22

## 2022-08-18 PROBLEM — K76.0 HEPATIC STEATOSIS: Status: ACTIVE | Noted: 2017-09-12

## 2022-08-18 PROBLEM — I63.9 CEREBRAL INFARCTION (HCC): Status: ACTIVE | Noted: 2021-02-09

## 2022-08-18 PROBLEM — Z86.16 PERSONAL HISTORY OF COVID-19: Status: ACTIVE | Noted: 2022-03-18

## 2022-08-18 PROBLEM — E87.6 HYPOKALEMIA: Status: ACTIVE | Noted: 2022-01-28

## 2022-08-18 PROBLEM — U07.1 COVID-19: Status: ACTIVE | Noted: 2022-01-28

## 2022-08-18 PROBLEM — R73.03 PRE-DIABETES: Status: ACTIVE | Noted: 2022-08-18

## 2022-08-18 PROBLEM — C64.9 RENAL CELL CARCINOMA (HCC): Status: ACTIVE | Noted: 2017-09-12

## 2022-08-18 PROBLEM — R39.9 SYMPTOMS INVOLVING URINARY SYSTEM: Status: ACTIVE | Noted: 2022-08-18

## 2022-08-18 PROBLEM — Z90.710 ACQUIRED ABSENCE OF BOTH CERVIX AND UTERUS: Status: ACTIVE | Noted: 2022-01-28

## 2022-08-18 PROBLEM — G93.41 METABOLIC ENCEPHALOPATHY: Status: ACTIVE | Noted: 2022-03-18

## 2022-08-18 PROBLEM — B96.1 KLEBSIELLA PNEUMONIAE (K. PNEUMONIAE) AS THE CAUSE OF DISEASES CLASSIFIED ELSEWHERE: Status: ACTIVE | Noted: 2022-01-28

## 2022-08-18 PROBLEM — G47.33 OSA (OBSTRUCTIVE SLEEP APNEA): Status: ACTIVE | Noted: 2020-01-22

## 2022-08-18 PROBLEM — R77.8 OTHER SPECIFIED ABNORMALITIES OF PLASMA PROTEINS: Status: ACTIVE | Noted: 2022-01-28

## 2022-08-18 PROBLEM — C64.1 CLEAR CELL RENAL CELL CARCINOMA, RIGHT (HCC): Status: ACTIVE | Noted: 2020-06-15

## 2022-08-18 PROBLEM — E11.9 CONTROLLED TYPE 2 DIABETES MELLITUS WITHOUT COMPLICATION, WITHOUT LONG-TERM CURRENT USE OF INSULIN (HCC): Status: ACTIVE | Noted: 2018-04-20

## 2022-08-18 PROBLEM — K83.8 COMMON BILE DUCT DILATION: Status: ACTIVE | Noted: 2017-06-19

## 2022-08-18 PROBLEM — R55 SYNCOPE AND COLLAPSE: Status: ACTIVE | Noted: 2022-03-17

## 2022-08-18 PROBLEM — R52 PAIN: Status: ACTIVE | Noted: 2022-08-18

## 2022-08-18 PROBLEM — E66.01 SEVERE OBESITY (BMI 35.0-39.9): Status: ACTIVE | Noted: 2017-06-07

## 2022-08-18 PROBLEM — E87.1 HYPO-OSMOLALITY AND HYPONATREMIA: Status: ACTIVE | Noted: 2022-01-28

## 2022-08-18 PROBLEM — R41.3 MEMORY LOSS: Status: ACTIVE | Noted: 2021-02-09

## 2022-08-18 PROBLEM — C54.1 ENDOMETRIAL CANCER (HCC): Status: ACTIVE | Noted: 2020-02-17

## 2022-08-18 PROBLEM — R60.0 LOCALIZED EDEMA: Status: ACTIVE | Noted: 2022-06-16

## 2022-08-18 PROBLEM — S00.83XA CONTUSION OF FACE: Status: ACTIVE | Noted: 2022-08-18

## 2022-08-18 PROBLEM — L53.9: Status: ACTIVE | Noted: 2020-08-26

## 2022-08-18 PROBLEM — N39.0 URINARY TRACT INFECTION, SITE NOT SPECIFIED: Status: ACTIVE | Noted: 2022-01-28

## 2022-08-18 PROBLEM — C44.91 BCC (BASAL CELL CARCINOMA OF SKIN): Status: ACTIVE | Noted: 2022-08-18

## 2022-08-18 PROBLEM — R35.0 FREQUENCY OF MICTURITION: Status: ACTIVE | Noted: 2022-03-06

## 2022-08-18 PROBLEM — N17.9 ACUTE KIDNEY FAILURE, UNSPECIFIED (HCC): Status: ACTIVE | Noted: 2022-03-18

## 2022-08-18 PROBLEM — I45.10 UNSPECIFIED RIGHT BUNDLE-BRANCH BLOCK: Status: ACTIVE | Noted: 2022-01-28

## 2022-08-18 PROBLEM — E05.90 HYPERTHYROIDISM: Status: ACTIVE | Noted: 2018-12-04

## 2022-08-18 PROBLEM — I72.2 RENAL ARTERY PSEUDOANEURYSM (HCC): Status: ACTIVE | Noted: 2020-09-09

## 2022-08-18 PROBLEM — M62.82 NON-TRAUMATIC RHABDOMYOLYSIS: Status: ACTIVE | Noted: 2020-08-26

## 2022-08-18 PROBLEM — C64.1 MALIGNANT NEOPLASM OF RIGHT KIDNEY, EXCEPT RENAL PELVIS (HCC): Status: ACTIVE | Noted: 2020-06-15

## 2022-08-19 ENCOUNTER — OFFICE VISIT (OUTPATIENT)
Dept: GERIATRIC MEDICINE | Age: 82
End: 2022-08-19
Payer: MEDICARE

## 2022-08-19 DIAGNOSIS — R79.89 ELEVATED LFTS: Primary | ICD-10-CM

## 2022-08-19 PROCEDURE — 1123F ACP DISCUSS/DSCN MKR DOCD: CPT | Performed by: NURSE PRACTITIONER

## 2022-08-19 PROCEDURE — 99309 SBSQ NF CARE MODERATE MDM 30: CPT | Performed by: NURSE PRACTITIONER

## 2022-08-22 ENCOUNTER — OFFICE VISIT (OUTPATIENT)
Dept: GERIATRIC MEDICINE | Age: 82
End: 2022-08-22
Payer: MEDICARE

## 2022-08-22 DIAGNOSIS — R53.83 FATIGUE, UNSPECIFIED TYPE: ICD-10-CM

## 2022-08-22 DIAGNOSIS — R79.89 ELEVATED LFTS: Primary | ICD-10-CM

## 2022-08-22 LAB
ALBUMIN SERPL-MCNC: 3.1 G/DL (ref 3.5–4.6)
ALP BLD-CCNC: 90 U/L (ref 40–130)
ALT SERPL-CCNC: 46 U/L (ref 0–33)
ANION GAP SERPL CALCULATED.3IONS-SCNC: 12 MEQ/L (ref 9–15)
AST SERPL-CCNC: 99 U/L (ref 0–35)
BASOPHILS ABSOLUTE: 0.1 K/UL (ref 0–0.2)
BASOPHILS RELATIVE PERCENT: 0.9 %
BILIRUB SERPL-MCNC: 7.5 MG/DL (ref 0.2–0.7)
BUN BLDV-MCNC: 12 MG/DL (ref 8–23)
CALCIUM SERPL-MCNC: 8.7 MG/DL (ref 8.5–9.9)
CHLORIDE BLD-SCNC: 104 MEQ/L (ref 95–107)
CO2: 21 MEQ/L (ref 20–31)
CREAT SERPL-MCNC: 0.72 MG/DL (ref 0.5–0.9)
EOSINOPHILS ABSOLUTE: 0.1 K/UL (ref 0–0.7)
EOSINOPHILS RELATIVE PERCENT: 0.8 %
GFR AFRICAN AMERICAN: >60
GFR NON-AFRICAN AMERICAN: >60
GLOBULIN: 4.5 G/DL (ref 2.3–3.5)
GLUCOSE BLD-MCNC: 74 MG/DL (ref 70–99)
HCT VFR BLD CALC: 30 % (ref 37–47)
HEMOGLOBIN: 9.8 G/DL (ref 12–16)
LYMPHOCYTES ABSOLUTE: 0.6 K/UL (ref 1–4.8)
LYMPHOCYTES RELATIVE PERCENT: 7.8 %
MCH RBC QN AUTO: 31.1 PG (ref 27–31.3)
MCHC RBC AUTO-ENTMCNC: 32.6 % (ref 33–37)
MCV RBC AUTO: 95.4 FL (ref 82–100)
MONOCYTES ABSOLUTE: 0.8 K/UL (ref 0.2–0.8)
MONOCYTES RELATIVE PERCENT: 10.3 %
NEUTROPHILS ABSOLUTE: 6.4 K/UL (ref 1.4–6.5)
NEUTROPHILS RELATIVE PERCENT: 80.2 %
PDW BLD-RTO: 19.7 % (ref 11.5–14.5)
PLATELET # BLD: 433 K/UL (ref 130–400)
POTASSIUM SERPL-SCNC: 3.2 MEQ/L (ref 3.4–4.9)
RBC # BLD: 3.15 M/UL (ref 4.2–5.4)
SODIUM BLD-SCNC: 137 MEQ/L (ref 135–144)
TOTAL PROTEIN: 7.6 G/DL (ref 6.3–8)
WBC # BLD: 8 K/UL (ref 4.8–10.8)

## 2022-08-22 PROCEDURE — 99309 SBSQ NF CARE MODERATE MDM 30: CPT | Performed by: NURSE PRACTITIONER

## 2022-08-22 PROCEDURE — 1123F ACP DISCUSS/DSCN MKR DOCD: CPT | Performed by: NURSE PRACTITIONER

## 2022-08-22 RX ORDER — INSULIN LISPRO 100 [IU]/ML
0-10 INJECTION, SOLUTION INTRAVENOUS; SUBCUTANEOUS 3 TIMES DAILY
COMMUNITY

## 2022-08-23 ENCOUNTER — OFFICE VISIT (OUTPATIENT)
Dept: GERIATRIC MEDICINE | Age: 82
End: 2022-08-23
Payer: MEDICARE

## 2022-08-23 DIAGNOSIS — F01.518 VASCULAR DEMENTIA WITH BEHAVIOR DISTURBANCE: ICD-10-CM

## 2022-08-23 DIAGNOSIS — C64.9 RENAL CELL CARCINOMA, UNSPECIFIED LATERALITY (HCC): ICD-10-CM

## 2022-08-23 DIAGNOSIS — S80.02XA HEMATOMA OF LEFT KNEE REGION: Primary | ICD-10-CM

## 2022-08-23 DIAGNOSIS — E11.9 CONTROLLED TYPE 2 DIABETES MELLITUS WITHOUT COMPLICATION, WITHOUT LONG-TERM CURRENT USE OF INSULIN (HCC): ICD-10-CM

## 2022-08-23 DIAGNOSIS — R55 SYNCOPE AND COLLAPSE: ICD-10-CM

## 2022-08-23 LAB — POTASSIUM SERPL-SCNC: 3.4 MEQ/L (ref 3.4–4.9)

## 2022-08-23 PROCEDURE — 1123F ACP DISCUSS/DSCN MKR DOCD: CPT | Performed by: INTERNAL MEDICINE

## 2022-08-23 PROCEDURE — 3044F HG A1C LEVEL LT 7.0%: CPT | Performed by: INTERNAL MEDICINE

## 2022-08-23 PROCEDURE — 99305 1ST NF CARE MODERATE MDM 35: CPT | Performed by: INTERNAL MEDICINE

## 2022-08-25 NOTE — PROGRESS NOTES
[facility]  7/29/2022    Kd Avery  is a 80 y.o. in the [place] being seen for a acute visit for   Chief Complaint   Patient presents with    Leg Swelling       HPI Patient being seen today for acute visit for leg swelling. They are eating and drinking at their baseline per nursing. They have had no recent falls with injuries. They are not complaining of any un addressed pain issues. Have had no recent choking or dysphagia issues. NO agitation or unusual mental behavioral issues. No past medical history on file. No past surgical history on file. No family history on file. Social History     Socioeconomic History    Marital status:      Spouse name: Not on file    Number of children: Not on file    Years of education: Not on file    Highest education level: Not on file   Occupational History    Not on file   Tobacco Use    Smoking status: Former    Smokeless tobacco: Former   Substance and Sexual Activity    Alcohol use: Not Currently    Drug use: Not on file    Sexual activity: Not on file   Other Topics Concern    Not on file   Social History Narrative    Not on file     Social Determinants of Health     Financial Resource Strain: Not on file   Food Insecurity: Not on file   Transportation Needs: Not on file   Physical Activity: Inactive    Days of Exercise per Week: 0 days    Minutes of Exercise per Session: 0 min   Stress: Not on file   Social Connections: Not on file   Intimate Partner Violence: Not on file   Housing Stability: Not on file       Allergies: Patient has no known allergies. NF MEDICATIONS REVIEWED    ROS:   Constitutional: There are no reports of behavioral issues, change in appetite, fever, or increased weakness. No bleeding issues. Respiratory: denies SOB, dyspnea, dyspnea on exertion  Cardiovascular: denies CP, lightheadedness, palpitations, PND, orthopnea, or claudication. GI: No N/V/D.    : no reports of dysuria, frequency or urgency  Extremities: No reports of pain issues,  significant edema to BLE. Patient reports her legs have swollen like this before but not for a very long time. Psych: at baseline confusion     Physical exam:   /66, temperature 98.4, heart rate 64, respirations 18, spo2 97%, weight 240lbs. Constitutional: Awake and alert sitting up on couch in her apartment, general weakness  HEENT: Normocephalic, [place],intact facial symmetry, conjunctiva pink, sclera non icteric,  buccal mucosa pink and moist  Speech clear. NECK: - no cervical or clavicular lymphadenopathy, euthyroid, no mass visualized  Cardiovascular: Regular rate, and rhythm. No murmurs, gallops or rubs noted. Respiratory: LCTA, even unlabored respirations, no accessory muscle use noted  GI: abdomen obese, NT, +BS x 4 Q, ND  : no suprapubic tenderness  Extremities: significant 2+ pitting edema BLE extending to thighs nicole. PPP, Seeping fluid and appear to be slightly erythemic but cool to touch  SKELETAL: no redness, or swelling over joints. Limited ROM but spontaneous movement x 4   Psych: pleasantly confused, good judgement, normal thought content  SKIN: no gross skin lesions noted on visualized skin, warm and dry. BLE as described above. ASSESSMENT:     Diagnosis Orders   1. Localized edema            PLAN:  Pt/POA agrees with POC   BMP, BNP, CBC with diff  Consult facility wound care CNP  Increase to lasix to 40mg  po QD x5 days, then resume prior order  KCl 10meq po qd x5 days  Notify patient's cardiologist Dr. Wilfredo Terry    Return in about 3 months (around 10/29/2022), or if symptoms worsen or fail to improve, for chronic conditions. Please note this report is partially produced by using speech recognition hardware. It may contain errors related to the system, including grammar, punctuation and spelling as well as words and phrases that may seem inaccurate.   For any questions or concerns feel free to contact me for clarification ________________________    Bridgette ELLIS, [address], [address] [place] [address]  Office [phone]  Fax [phone]

## 2022-08-26 ENCOUNTER — OFFICE VISIT (OUTPATIENT)
Dept: NEUROLOGY | Age: 82
End: 2022-08-26
Payer: MEDICARE

## 2022-08-26 VITALS
SYSTOLIC BLOOD PRESSURE: 106 MMHG | DIASTOLIC BLOOD PRESSURE: 62 MMHG | WEIGHT: 212.2 LBS | HEART RATE: 68 BPM | BODY MASS INDEX: 32.26 KG/M2

## 2022-08-26 DIAGNOSIS — F01.50 VASCULAR DEMENTIA WITHOUT BEHAVIORAL DISTURBANCE (HCC): Primary | ICD-10-CM

## 2022-08-26 DIAGNOSIS — K76.82 HEPATIC ENCEPHALOPATHY: ICD-10-CM

## 2022-08-26 DIAGNOSIS — R27.0 ATAXIA: ICD-10-CM

## 2022-08-26 DIAGNOSIS — G93.41 METABOLIC ENCEPHALOPATHY: ICD-10-CM

## 2022-08-26 PROCEDURE — 99204 OFFICE O/P NEW MOD 45 MIN: CPT | Performed by: PSYCHIATRY & NEUROLOGY

## 2022-08-26 PROCEDURE — 1123F ACP DISCUSS/DSCN MKR DOCD: CPT | Performed by: PSYCHIATRY & NEUROLOGY

## 2022-08-26 RX ORDER — ACETAMINOPHEN 325 MG/1
TABLET ORAL
COMMUNITY

## 2022-08-26 RX ORDER — TELMISARTAN 80 MG/1
TABLET ORAL
COMMUNITY
Start: 2022-05-05 | End: 2022-09-26

## 2022-08-26 ASSESSMENT — ENCOUNTER SYMPTOMS
PHOTOPHOBIA: 0
CHOKING: 0
BACK PAIN: 0
SHORTNESS OF BREATH: 0
COLOR CHANGE: 0
VOMITING: 0
NAUSEA: 0
TROUBLE SWALLOWING: 0

## 2022-08-26 NOTE — PROGRESS NOTES
Subjective:      Patient ID: Antione Donald is a 80 y.o. female who presents today for: Dementia  Chief Complaint   Patient presents with    New Patient     Pt has been diagnosis with dementia by Dr. Laura Flores in [address] back in 10/2021. Pt is in assisted living memory care unit. Pt is getting dressed on own, not pre pairing own meal, house keeping does cleaning but she makes her bed, she is still showering self. Pt did have a recent fall and is using a walk for the time being. HPI 80 right-handed male referred here for dementia. Patient was seen by Dr. Laura Flores at [facility] with a diagnosis of dementia. She does stress on her own but is not able to prepare meals housekeeping does her cleaning and makes her bed and she is still showering. She did have 1 fall and using a walker to walk. Patient on Aricept 10 mg and Namenda 10 mg twice a day and maximized on neurocognitive medications. Patient has insomnia and is on very low-dose of Seroquel as well. Patient was recently evaluated by general surgery for a symptomatic ventral hernia. Claribel Printers a few weeks ago and injured her left knee. At that time it was noted that her bilirubin was elevated. She is 21. History of hepatic dysfunction with the work-up is not clear as I do not have much records on this. This does not affect her mind. She still appears to be functional.  No past medical history on file. No past surgical history on file.   Social History     Socioeconomic History    Marital status:      Spouse name: Not on file    Number of children: Not on file    Years of education: Not on file    Highest education level: Not on file   Occupational History    Not on file   Tobacco Use    Smoking status: Former    Smokeless tobacco: Former   Substance and Sexual Activity    Alcohol use: Not Currently    Drug use: Not on file    Sexual activity: Not on file   Other Topics Concern    Not on file   Social History Narrative    Not on file     Social Determinants [facility]     Financial Resource Strain: Not on file   Food Insecurity: Not on file   Transportation Needs: Not on file   Physical Activity: Inactive    Days of Exercise per Week: 0 days    Minutes of Exercise per Session: 0 min   Stress: Not on file   Social Connections: Not on file   Intimate Partner Violence: Not on file   Housing Stability: Not on file     No family history on file. No Known Allergies    Current Outpatient Medications   Medication Sig Dispense Refill    acetaminophen (TYLENOL) 325 MG tablet Take by mouth      telmisartan (MICARDIS) 80 MG tablet TAKE 1 TABLET DAILY      insulin lispro, 1 Unit Dial, 100 UNIT/ML SOPN Inject 0-10 Units into the skin 3 times daily Indications: Type 2 Diabetes Inject as per sliding scale: -737=9n; 201-250=4u; 251-300=6u; 301-350=8u; 351-400=10u. Notify MD/NP if BS <60 or >400. Lactobacillus (ACIDOPHILUS) CAPS capsule Take 1 capsule by mouth in the morning. Indications: Nutritional Support. escitalopram (LEXAPRO) 10 MG tablet Take 10 mg by mouth in the morning. Indications: Depression. furosemide (LASIX) 20 MG tablet Take 20 mg by mouth in the morning. Indications: High Blood Pressure Disorder. melatonin 3 MG TABS tablet Take 3 mg by mouth nightly Indications: Trouble Sleeping      memantine (NAMENDA) 10 MG tablet Take 10 mg by mouth in the morning and 10 mg before bedtime. Indications: Decline in Cognition due to a Brain Disease. QUEtiapine (SEROQUEL) 25 MG tablet Take 12.5 mg by mouth in the morning. Indications: Behavioral Disorders associated with Dementia. telmisartan-hydroCHLOROthiazide (MICARDIS HCT) 80-12.5 MG per tablet Take 1 tablet by mouth in the morning. Indications: High Blood Pressure Disorder.       ondansetron (ZOFRAN-ODT) 4 MG disintegrating tablet Take 4 mg by mouth every 6 hours as needed for Nausea or Vomiting      carvedilol (COREG) 25 MG tablet Take 25 mg by mouth 2 times daily (with meals) Indications: High Blood Pressure Disorder      donepezil (ARICEPT) 10 MG tablet Take 10 mg by mouth nightly Indications: Decline in Cognition due to a Brain Disease      SITagliptin (JANUVIA) 100 MG tablet Take 100 mg by mouth daily Indications: Type 2 Diabetes      montelukast (SINGULAIR) 5 MG chewable tablet Take 5 mg by mouth nightly Indications: Asthma      cyanocobalamin 1000 MCG tablet Take 1,000 mcg by mouth daily Indications: Nutritional Support       No current facility-administered medications for this visit. Review of Systems   Constitutional:  Negative for fever. HENT:  Negative for ear pain, tinnitus and trouble swallowing. Eyes:  Negative for photophobia and visual disturbance. Respiratory:  Negative for choking and shortness of breath. Cardiovascular:  Negative for chest pain and palpitations. Gastrointestinal:  Negative for nausea and vomiting. Musculoskeletal:  Negative for back pain, gait problem, joint swelling, myalgias, neck pain and neck stiffness. Skin:  Negative for color change. Allergic/Immunologic: Negative for food allergies. Neurological:  Positive for weakness. Negative for dizziness, tremors, seizures, syncope, facial asymmetry, speech difficulty, light-headedness, numbness and headaches. Psychiatric/Behavioral:  Negative for behavioral problems, confusion, hallucinations and sleep disturbance. Objective:   /62 (Site: Left Upper Arm, Position: Sitting, Cuff Size: Large Adult)   Pulse 68   Wt 212 lb 3.2 oz (96.3 kg)   BMI 32.26 kg/m²     Physical Exam  Vitals reviewed. Eyes:      Pupils: Pupils are equal, round, and reactive to light. Cardiovascular:      Rate and Rhythm: Normal rate and regular rhythm. Heart sounds: No murmur heard. Pulmonary:      Effort: Pulmonary effort is normal.      Breath sounds: Normal breath sounds. Abdominal:      General: Bowel sounds are normal.   Musculoskeletal:         General: Normal range of motion.       Cervical back: Normal range of motion. Skin:     General: Skin is warm. Neurological:      Mental Status: She is alert and oriented to person, place, and time. Cranial Nerves: No cranial nerve deficit. Sensory: No sensory deficit. Motor: No abnormal muscle tone. Coordination: Coordination normal.      Deep Tendon Reflexes: Reflexes are normal and symmetric. Babinski sign absent on the right side. Babinski sign absent on the left side. Comments: MMSE 23 with significant swelling of her lower extremities and the left knee. She is areflexic in the lower extremity. No asterixis or tremors are noted we were not able to walk her. Psychiatric:         Mood and Affect: Mood normal.       Patient was never admitted.     Lab Results   Component Value Date/Time    WBC 8.0 08/22/2022 11:57 AM    RBC 3.15 08/22/2022 11:57 AM    HGB 9.8 08/22/2022 11:57 AM    HCT 30.0 08/22/2022 11:57 AM    MCV 95.4 08/22/2022 11:57 AM    MCH 31.1 08/22/2022 11:57 AM    MCHC 32.6 08/22/2022 11:57 AM    RDW 19.7 08/22/2022 11:57 AM     08/22/2022 11:57 AM     Lab Results   Component Value Date/Time     08/22/2022 11:57 AM    K 3.4 08/23/2022 05:28 PM     08/22/2022 11:57 AM    CO2 21 08/22/2022 11:57 AM    BUN 12 08/22/2022 11:57 AM    CREATININE 0.72 08/22/2022 11:57 AM    GFRAA >60.0 08/22/2022 11:57 AM    LABGLOM >60.0 08/22/2022 11:57 AM    GLUCOSE 74 08/22/2022 11:57 AM    PROT 7.6 08/22/2022 11:57 AM    LABALBU 3.1 08/22/2022 11:57 AM    CALCIUM 8.7 08/22/2022 11:57 AM    BILITOT 7.5 08/22/2022 11:57 AM    ALKPHOS 90 08/22/2022 11:57 AM    AST 99 08/22/2022 11:57 AM    ALT 46 08/22/2022 11:57 AM     No results found for: PROTIME, INR  Lab Results   Component Value Date/Time    TSH 2.260 08/12/2022 09:26 AM     Lab Results   Component Value Date/Time    TRIG 97 08/12/2022 09:26 AM    HDL 17 08/12/2022 09:26 AM    LDLCALC 70 08/12/2022 09:26 AM     No results found for: [facility], Kieran Andrade, Brie Walker, LABMETH, OPIATESCREENURINE, PHENCYCLIDINESCREENURINE, PPXUR, ETOH  No results found for: LITHIUM, DILFRTOT, VALPROATE    Assessment:       Diagnosis Orders   1. Vascular dementia without behavioral disturbance ([facility] [address]        2. Metabolic encephalopathy        3. Hepatic encephalopathy ([facility]        4. Ataxia        Dementia likely to be a vascular mention with some fluctuation. Patient is already on Aricept 10 mg and Namenda 10 mg twice a day and maximized. Patient actually fell and had some injuries at that time was discovered that she has liver dysfunction. No other cause was found for elevated bilirubin and could be an enzyme deficiency. This may add to some degree of hepatic encephalopathy and may show some fluctuating thing and intermittently may require more help. At this time we have not recommended any intervention and we will continue to follow. She is still very functional able to dress walks around and has some help at the nursing home where she is. She is quite aware and has insight to her problems as well. Low-dose Seroquel is used for her nighttime sedation. We will follow in a few months and earlier if there are any concerns. Patient was diagnosed in [facility] few years ago. MMSE done today and is 23      Lewis R. Marcelo Ayoub MD, Chuck Celestin, American Board of Psychiatry & Neurology  Board Certified in Vascular Neurology  Board Certified in Neuromuscular Medicine  Certified in [facility]:      No orders of the defined types were placed in this encounter. No orders of the defined types were placed in this encounter. No follow-ups on file.       Kathy Carlson MD

## 2022-08-30 ENCOUNTER — OFFICE VISIT (OUTPATIENT)
Dept: GERIATRIC MEDICINE | Age: 82
End: 2022-08-30
Payer: MEDICARE

## 2022-08-30 DIAGNOSIS — I10 ESSENTIAL HYPERTENSION: ICD-10-CM

## 2022-08-30 DIAGNOSIS — I48.91 ATRIAL FIBRILLATION, UNSPECIFIED TYPE (HCC): ICD-10-CM

## 2022-08-30 DIAGNOSIS — E11.9 CONTROLLED TYPE 2 DIABETES MELLITUS WITHOUT COMPLICATION, WITHOUT LONG-TERM CURRENT USE OF INSULIN (HCC): ICD-10-CM

## 2022-08-30 DIAGNOSIS — K76.82 HEPATIC ENCEPHALOPATHY: ICD-10-CM

## 2022-08-30 DIAGNOSIS — D53.9 NUTRITIONAL ANEMIA: ICD-10-CM

## 2022-08-30 DIAGNOSIS — R79.89 ELEVATED LFTS: ICD-10-CM

## 2022-08-30 DIAGNOSIS — F01.518 VASCULAR DEMENTIA WITH BEHAVIOR DISTURBANCE: ICD-10-CM

## 2022-08-30 DIAGNOSIS — I72.2: Primary | ICD-10-CM

## 2022-08-30 DIAGNOSIS — K83.8 COMMON BILE DUCT DILATION: ICD-10-CM

## 2022-08-30 DIAGNOSIS — R55 SYNCOPE AND COLLAPSE: ICD-10-CM

## 2022-08-30 DIAGNOSIS — R60.0 LOCALIZED EDEMA: ICD-10-CM

## 2022-08-30 PROCEDURE — 3044F HG A1C LEVEL LT 7.0%: CPT | Performed by: NURSE PRACTITIONER

## 2022-08-30 PROCEDURE — 99316 NF DSCHRG MGMT 30 MIN+: CPT | Performed by: NURSE PRACTITIONER

## 2022-08-31 PROBLEM — S80.02XA HEMATOMA OF LEFT KNEE REGION: Status: ACTIVE | Noted: 2022-08-31

## 2022-08-31 PROBLEM — D64.9 ANEMIA: Status: ACTIVE | Noted: 2022-08-31

## 2022-08-31 NOTE — PROGRESS NOTES
Nursing Home:  [address] [place] [address] Road    The patient is being seen today for follow-up after recent admission to this facility for:  Chief Complaint   Patient presents with    Follow-Up from Hospital         SUBJECTIVE: Patient being seen today for follow-up after recent admission to this facility from the hospital with primary diagnosis of vascular dementia without behaviors, fall, A. fib, DM type II, anemia, essential hypertension, and hematoma left knee region. FAMILY AND SOCIAL HISTORY:  Refer to H&P. No past medical history on file. No family history on file. Social History     Socioeconomic History    Marital status:      Spouse name: Not on file    Number of children: Not on file    Years of education: Not on file    Highest education level: Not on file   Occupational History    Not on file   Tobacco Use    Smoking status: Former    Smokeless tobacco: Former   Substance and Sexual Activity    Alcohol use: Not Currently    Drug use: Not on file    Sexual activity: Not on file   Other Topics Concern    Not on file   Social History Narrative    Not on file     Social Determinants of Health     Financial Resource Strain: Not on file   Food Insecurity: Not on file   Transportation Needs: Not on file   Physical Activity: Inactive    Days of Exercise per Week: 0 days    Minutes of Exercise per Session: 0 min   Stress: Not on file   Social Connections: Not on file   Intimate Partner Violence: Not on file   Housing Stability: Not on file     ALLERGIES:  Patient has no known allergies.     MEDICATIONS: Acidophilus capsule 1 capsule p.o. daily, Admelog Solostar 100 unit/mL solution pen injector inject as per sliding scale, carvedilol 25 mg tab p.o. twice daily, cephalexin capsule 500 mg capsule p.o. twice daily x7 days, donepezil 10 mg tab p.o. daily, Dulcolax suppository 10 mg rectal suppository daily as needed if no relief from milk of mag, escitalopram oxalate 10 mg tab 1 tab p.o. daily, Januvia 100 mg tab p.o. daily, Lasix 20 mg tab p.o. daily, melatonin 3 mg tab p.o. daily, memantine 10 mg tab p.o. twice daily, milk of magnesia suspension 1200 mg per 15 mL give 30 mL p.o. daily as needed, montelukast sodium tablet chewable 5 mg tab p.o. daily, Seroquel 25 mg tab give one half tab p.o. daily, telmisartan-HCTZ tablet 80-12.5 mg tab p.o. daily, Tylenol 325 mg tab 2 tabs p.o. every 4 as needed for elevated temp greater than 100 or pain not to exceed 3 g in 24 hours, vitamin B12 extended release tab 1 tab p.o. daily, Zofran ODT 4 mg disintegrating tablet p.o. every 6 as needed x14 days. REVIEW OF SYSTEMS:  Resident denies any headache, dizziness, blurred vision. She denies any fever, chills, sore throat. She denies any rashes, bruises, or open areas. She denies any chest pain, chest discomfort, or heart palpitations. She denies any shortness of breath or cough. She denies any nausea, vomiting, or abdominal pain. She denies any urinary urgency, frequency, or dysuria. She denies any constipation or diarrhea. She states she is eating okay, sleeping okay, and she currently has no pain. She reports that her knee bothers her occasionally but that Tylenol helps with her pain. PHYSICAL EXAMINATION:  Most recent vital signs: Blood pressure 133/76, temperature 97.7, heart rate 86, respirations 18, pulse ox 94% room air. Constitutional:  Resident is a 80 y.o. female alert, pleasant, and cooperative with exam.  No apparent distress. Integument:  Pink, warm, dry. No visible rashes, or open areas. She does have a large ecchymotic area to the posterior aspect of her chin  HEENT:  Normocephalic, atraumatic. External ears appear to be within normal limits. [place]. Conjunctivae pink. Sclerae icteric. Mucous membranes pink, moist.  No oropharyngeal exudate. Neck:  Supple. No cervical or clavicular lymphadenopathy.   No masses noted  Cardiovascular:  Heart rate regular with ectopic beats.  Respiratory:  Lung sounds Normal.  Respirations nonlabored. The patient is not using accessory muscles with breathing. Current pulse ox 94% room air. Abdomen:  Soft, nontender, nondistended, normoactive bowel sounds. No masses noted. Musculoskeletal: No muscle tenderness. Joint tenderness left knee. PV:  Peripheral pulses present. She  does have nonpitting edema to left knee. Psychological: Mood stable. Affect pleasant. Speech normal rate and tone. ASSESSMENT AND PLAN:      Diagnosis Orders   1. Vascular dementia without behavioral disturbance ([facility] [address]        2. Fall, subsequent encounter        3. Atrial fibrillation, unspecified type ([facility] [address]        4. Controlled type 2 diabetes mellitus without complication, without long-term current use of insulin ([facility] [address]        5. Anemia, unspecified type        6. Essential hypertension        7. Hematoma of left knee region             Mood stable. She has not had any further decrease in her cognition or increase in her behaviors. Continue memantine and donepezil as ordered. No falls since admission to this facility. Continue to work with PT and OT. Currently in a regular rhythm with ectopic beats. Patient denies any chest pain, chest discomfort, or heart palpitations she is aware of. Continue carvedilol as ordered. Continue current diabetic medication regimen. Current hemoglobin A1c 4.4. List recent hemoglobin 8.8, hematocrit 26.7. We will continue to monitor her counts closely. Blood pressure stable. She has not had any hypertensive or hypotensive events. We will continue her current antihypertensive medication regimen. Patient is being followed by orthopedics. She reports that her acetaminophen is effective in meeting her pain needs. I have reviewed this resident's medication and treatment plan as well as most recent lab work.   LFTs in a.m., serum amylase and lipase in a.m., abdominal ultrasound in AM.  I have explained to the resident that her whites of her eyes are slightly yellow and we are going to do further lab tests as well as an abdominal ultrasound to ensure that her liver is functioning properly. Patient and  are in agreement. No further changes will be made at this time. Return in about 1 week (around 8/19/2022), or if symptoms worsen or fail to improve. Please note this report is partially produced by using speech recognition hardware. It may contain errors related to the system, including grammar, punctuation and spelling as well as words and phrases that may seem inaccurate. For any questions or concerns feel free to contact me for clarification        Electronically Signed By: Nancy Mercado. Ian ELLIS CNP on 8/31/22       ________________________    Nancy Mercado.  Mary ELLIS, [address]  Office [phone]  Fax [phone]

## 2022-09-07 ENCOUNTER — OFFICE VISIT (OUTPATIENT)
Dept: GERIATRIC MEDICINE | Age: 82
End: 2022-09-07
Payer: MEDICARE

## 2022-09-07 DIAGNOSIS — R60.0 UNILATERAL EDEMA OF LOWER EXTREMITY: ICD-10-CM

## 2022-09-07 DIAGNOSIS — R17 JAUNDICE: Primary | ICD-10-CM

## 2022-09-07 LAB
ALBUMIN SERPL-MCNC: 3.4 G/DL (ref 3.5–4.6)
ALP BLD-CCNC: 89 U/L (ref 40–130)
ALT SERPL-CCNC: 24 U/L (ref 0–33)
AMMONIA: 29 UMOL/L (ref 11–51)
ANION GAP SERPL CALCULATED.3IONS-SCNC: 9 MEQ/L (ref 9–15)
AST SERPL-CCNC: 51 U/L (ref 0–35)
BASOPHILS ABSOLUTE: 0.1 K/UL (ref 0–0.2)
BASOPHILS RELATIVE PERCENT: 0.9 %
BILIRUB SERPL-MCNC: 3.6 MG/DL (ref 0.2–0.7)
BILIRUBIN DIRECT: 1.7 MG/DL (ref 0–0.4)
BILIRUBIN, INDIRECT: 1.9 MG/DL (ref 0–0.6)
BUN BLDV-MCNC: 17 MG/DL (ref 8–23)
CALCIUM SERPL-MCNC: 8.7 MG/DL (ref 8.5–9.9)
CHLORIDE BLD-SCNC: 102 MEQ/L (ref 95–107)
CO2: 29 MEQ/L (ref 20–31)
CREAT SERPL-MCNC: 0.88 MG/DL (ref 0.5–0.9)
EOSINOPHILS ABSOLUTE: 0.1 K/UL (ref 0–0.7)
EOSINOPHILS RELATIVE PERCENT: 2.4 %
GFR AFRICAN AMERICAN: >60
GFR NON-AFRICAN AMERICAN: >60
GLOBULIN: 4.2 G/DL (ref 2.3–3.5)
GLUCOSE BLD-MCNC: 93 MG/DL (ref 70–99)
HCT VFR BLD CALC: 35 % (ref 37–47)
HEMOGLOBIN: 11.4 G/DL (ref 12–16)
LYMPHOCYTES ABSOLUTE: 0.7 K/UL (ref 1–4.8)
LYMPHOCYTES RELATIVE PERCENT: 12.4 %
MCH RBC QN AUTO: 30.7 PG (ref 27–31.3)
MCHC RBC AUTO-ENTMCNC: 32.6 % (ref 33–37)
MCV RBC AUTO: 94.3 FL (ref 82–100)
MONOCYTES ABSOLUTE: 0.6 K/UL (ref 0.2–0.8)
MONOCYTES RELATIVE PERCENT: 10.8 %
NEUTROPHILS ABSOLUTE: 4.1 K/UL (ref 1.4–6.5)
NEUTROPHILS RELATIVE PERCENT: 73.5 %
PDW BLD-RTO: 18.2 % (ref 11.5–14.5)
PLATELET # BLD: 241 K/UL (ref 130–400)
POTASSIUM SERPL-SCNC: 3.1 MEQ/L (ref 3.4–4.9)
RBC # BLD: 3.71 M/UL (ref 4.2–5.4)
SODIUM BLD-SCNC: 140 MEQ/L (ref 135–144)
TOTAL PROTEIN: 7.6 G/DL (ref 6.3–8)
WBC # BLD: 5.6 K/UL (ref 4.8–10.8)

## 2022-09-07 PROCEDURE — 1123F ACP DISCUSS/DSCN MKR DOCD: CPT | Performed by: NURSE PRACTITIONER

## 2022-09-07 PROCEDURE — 99309 SBSQ NF CARE MODERATE MDM 30: CPT | Performed by: NURSE PRACTITIONER

## 2022-09-09 ENCOUNTER — OFFICE VISIT (OUTPATIENT)
Dept: GERIATRIC MEDICINE | Age: 82
End: 2022-09-09
Payer: MEDICARE

## 2022-09-09 DIAGNOSIS — R79.89 ELEVATED LFTS: ICD-10-CM

## 2022-09-09 DIAGNOSIS — R60.0 EDEMA OF BOTH LOWER EXTREMITIES: Primary | ICD-10-CM

## 2022-09-09 PROCEDURE — 99309 SBSQ NF CARE MODERATE MDM 30: CPT | Performed by: NURSE PRACTITIONER

## 2022-09-09 PROCEDURE — 1123F ACP DISCUSS/DSCN MKR DOCD: CPT | Performed by: NURSE PRACTITIONER

## 2022-09-10 LAB
ALBUMIN SERPL-MCNC: 3.3 G/DL (ref 3.5–4.6)
ALP BLD-CCNC: 98 U/L (ref 40–130)
ALT SERPL-CCNC: 25 U/L (ref 0–33)
AST SERPL-CCNC: 58 U/L (ref 0–35)
BILIRUB SERPL-MCNC: 3.5 MG/DL (ref 0.2–0.7)
BILIRUBIN DIRECT: 1.7 MG/DL (ref 0–0.4)
BILIRUBIN, INDIRECT: 1.8 MG/DL (ref 0–0.6)
POTASSIUM SERPL-SCNC: 2.7 MEQ/L (ref 3.4–4.9)
TOTAL PROTEIN: 7.2 G/DL (ref 6.3–8)

## 2022-09-11 NOTE — PROGRESS NOTES
Patient Name: Any Moya    YOB: 1940  Medical Record Number: [id number]    History of Present Illness:  81-year-old woman is admitted here after recent hospitalization. Patient baseline history of dementia with agitation afibrillation with anticoagulation. Patient's suffered recent falls and ER evaluation patient has been ambulatory with walker patient suffered a possible injury brought to ER evaluation patient did undergo ER evaluation she was stabilized. Patient did require transfer to the skilled status facility as opposed to the assisted living. Patient has been function stable this time pleasant but confused pain-free at this time. Review of Systems   Unable to perform ROS: Dementia   All other systems reviewed and are negative. Review of Systems: All 14 review of systems negative other than as stated above    Social History     Tobacco Use    Smoking status: Former    Smokeless tobacco: Former   Substance Use Topics    Alcohol use: Not Currently         No past medical history on file. No past surgical history on file. Current Outpatient Medications on File Prior to Visit   Medication Sig Dispense Refill    Lactobacillus (ACIDOPHILUS) CAPS capsule Take 1 capsule by mouth in the morning. Indications: Nutritional Support. escitalopram (LEXAPRO) 10 MG tablet Take 10 mg by mouth in the morning. Indications: Depression. furosemide (LASIX) 20 MG tablet Take 20 mg by mouth in the morning. Indications: High Blood Pressure Disorder. melatonin 3 MG TABS tablet Take 3 mg by mouth nightly Indications: Trouble Sleeping      memantine (NAMENDA) 10 MG tablet Take 10 mg by mouth in the morning and 10 mg before bedtime. Indications: Decline in Cognition due to a Brain Disease. QUEtiapine (SEROQUEL) 25 MG tablet Take 12.5 mg by mouth in the morning. Indications: Behavioral Disorders associated with Dementia.       telmisartan-hydroCHLOROthiazide (MICARDIS HCT) 80-12.5 MG per tablet Take 1 tablet by mouth in the morning. Indications: High Blood Pressure Disorder. ondansetron (ZOFRAN-ODT) 4 MG disintegrating tablet Take 4 mg by mouth every 6 hours as needed for Nausea or Vomiting      carvedilol (COREG) 25 MG tablet Take 25 mg by mouth 2 times daily (with meals) Indications: High Blood Pressure Disorder      donepezil (ARICEPT) 10 MG tablet Take 10 mg by mouth nightly Indications: Decline in Cognition due to a Brain Disease      SITagliptin (JANUVIA) 100 MG tablet Take 100 mg by mouth daily Indications: Type 2 Diabetes      montelukast (SINGULAIR) 5 MG chewable tablet Take 5 mg by mouth nightly Indications: Asthma      cyanocobalamin 1000 MCG tablet Take 1,000 mcg by mouth daily Indications: Nutritional Support       No current facility-administered medications on file prior to visit. No Known Allergies      No family history on file. Physical Exam:      Physical Exam  Vitals and nursing note reviewed. Constitutional:       Appearance: Normal appearance. She is normal weight. HENT:      Head: Normocephalic and atraumatic. Nose: Nose normal.      Mouth/Throat:      Mouth: Mucous membranes are moist.   Eyes:      Extraocular Movements: Extraocular movements intact. Cardiovascular:      Rate and Rhythm: Normal rate. Rhythm irregular. Pulses: Normal pulses. Pulmonary:      Effort: Pulmonary effort is normal.      Breath sounds: Normal breath sounds. Abdominal:      General: Bowel sounds are normal.      Palpations: Abdomen is soft. Tenderness: There is no abdominal tenderness. Musculoskeletal:         General: Swelling present. Cervical back: Neck supple. Skin:     General: Skin is warm. Findings: No bruising. Neurological:      Mental Status: She is disoriented. Motor: Weakness present. Psychiatric:         Mood and Affect: Mood normal.       There were no vitals taken for this visit.       .   Lab Results   Component Value Date    WBC 5.6 09/07/2022    HGB 11.4 (L) 09/07/2022    HCT 35.0 (L) 09/07/2022    MCV 94.3 09/07/2022     09/07/2022     Lab Results   Component Value Date/Time     09/07/2022 05:09 PM    K 2.7 09/10/2022 08:49 AM     09/07/2022 05:09 PM    CO2 29 09/07/2022 05:09 PM    BUN 17 09/07/2022 05:09 PM    CREATININE 0.88 09/07/2022 05:09 PM    GLUCOSE 93 09/07/2022 05:09 PM    CALCIUM 8.7 09/07/2022 05:09 PM                ASSESSMENT:  Patient Active Problem List   Diagnosis    Dementia ([facility] [address]    Unspecified atrial fibrillation (HCC)    Malignant neoplasm of right kidney, except renal pelvis (HCC)    MONY (obstructive sleep apnea)    Severe obesity (BMI 35.0-39. 9)    Age-related osteoporosis without current pathological fracture    Congenital hypothyroidism without goiter    Other hyperlipidemia    Gastroesophageal reflux disease without esophagitis    Abdominal wall hernia    Fall    Edema of both lower extremities    Acquired absence of both cervix and uterus    Acute kidney failure, unspecified (HCC)    BCC (basal cell carcinoma of skin)    Cerebral infarction (HCC)    Clear cell renal cell carcinoma, right (HCC)    Common bile duct dilation    Controlled type 2 diabetes mellitus without complication, without long-term current use of insulin (HCC)    Contusion of face    COVID-19    Endometrial cancer (HCC)    Erythema of periwound skin    Essential hypertension    Frequency of micturition    Hepatic steatosis    Hyperthyroidism    Hypokalemia    Hypo-osmolality and hyponatremia    Impaired glucose tolerance    Klebsiella pneumoniae (k. pneumoniae) as the cause of diseases classified elsewhere    Localized edema    Memory loss    Hepatic encephalopathy (HCC)    Murmur    Nonspecific abnormal results of thyroid function study    Non-traumatic rhabdomyolysis    Nutritional anemia    Other specified abnormalities of plasma proteins    Pain    Personal history of COVID-19    Pre-diabetes    Renal artery pseudoaneurysm (HCC)    Renal cell carcinoma (HCC)    Symptoms involving urinary system    Syncope and collapse    Unspecified right bundle-branch block    Urinary tract infection, site not specified    Ataxia    Anemia    Hematoma of left knee region         PLAN:   Diagnosis Orders   1. Acute cystitis without hematuria        2. Atrial fibrillation, unspecified type ([facility] [address]        3. Malignant neoplasm of right kidney, except renal pelvis ([facility] [address]        4. Controlled type 2 diabetes mellitus without complication, without long-term current use of insulin ([facility] [address]        5. Vascular dementia without behavioral disturbance ([facility] [address]          Continue course of physical therapy outpatient therapy. Continue current anticoagulation. Ongoing falls precaution. Repeat CBC BMP pending. Continue nutritional support. Goal maximize functional status continue monitor for acute change mental status has a recent CT of the head of as well as pelvis without evidence acute fracture. Focus on balance during patient denice at increased risk for falls as well as delirium. Globally weak at this time. Please note orders entered on site at facility after discussion with appropriate facility nursing/therapy/ / nutritional staff. Current longstanding medical problems and acute medical issues addressed with staff. Available data and data elements in on site paper chart reviewed and analyzed. Current external consultant notes reviewed in on site chart. Ordered laboratory testing and imaging will be reviewed when available. This patient's need for psychiatric medication has been reviewed. Will consider further adjustment and possible further evaluations by mental health services.

## 2022-09-12 LAB
ANION GAP SERPL CALCULATED.3IONS-SCNC: 12 MEQ/L (ref 9–15)
BUN BLDV-MCNC: 17 MG/DL (ref 8–23)
CALCIUM SERPL-MCNC: 8.7 MG/DL (ref 8.5–9.9)
CHLORIDE BLD-SCNC: 104 MEQ/L (ref 95–107)
CO2: 23 MEQ/L (ref 20–31)
CREAT SERPL-MCNC: 0.8 MG/DL (ref 0.5–0.9)
GFR AFRICAN AMERICAN: >60
GFR NON-AFRICAN AMERICAN: >60
GLUCOSE BLD-MCNC: 71 MG/DL (ref 70–99)
POTASSIUM SERPL-SCNC: 3.7 MEQ/L (ref 3.4–4.9)
SODIUM BLD-SCNC: 139 MEQ/L (ref 135–144)

## 2022-09-14 PROBLEM — R79.89 ELEVATED LFTS: Status: ACTIVE | Noted: 2022-09-14

## 2022-09-14 NOTE — PROGRESS NOTES
Inactive    Days of Exercise per Week: 0 days    Minutes of Exercise per Session: 0 min   Stress: Not on file   Social Connections: Not on file   Intimate Partner Violence: Not on file   Housing Stability: Not on file       Allergies: Patient has no known allergies. NF MEDICATIONS REVIEWED    ROS:   Constitutional: There are no reports of behavioral issues, change in appetite, fever, or increased weakness. No bleeding issues. Respiratory: denies SOB, dyspnea, dyspnea on exertion  Cardiovascular: denies CP, lightheadedness, palpitations, PND, orthopnea, or claudication. GI: No N/V/D. : no reports of dysuria, frequency or urgency  Extremities: No reports of pain issues, edema noted in her left lower extremity somewhat more so than the right. Psych: at baseline confusion     Physical exam:   Blood pressure 135/86, temp 97.2, heart rate 95, respirations 18, pulse ox 95% room air. Constitutional: Awake and alert sitting up in recliner, general weakness  HEENT: Normocephalic, hard of hearing,intact facial symmetry, conjunctiva pink, sclera non icteric,  buccal mucosa pink and moist  Speech clear. NECK: - no cervical or clavicular lymphadenopathy, euthyroid, no mass visualized  Cardiovascular: Regular rate, and rhythm. No murmurs, gallops or rubs noted. Respiratory: LCTA, even unlabored respirations, no accessory muscle use noted  GI: abdomen NT, +BS x 4 Q, ND  : No suprapubic tenderness  Extremities: no ankle edema, PPP  SKELETAL: no redness. Swelling noted over left knee joint and leg. Limited ROM but spontaneous movement x 4   Psych: pleasantly confused, good judgement, normal thought content  SKIN: no gross skin lesions noted on visualized skin, warm and dry    ASSESSMENT:     Diagnosis Orders   1. Elevated LFTs            PLAN:  Pt/POA agrees with POC   CMP 8/22/2022  Continue working with PT/OT    Return in about 1 week (around 8/26/2022), or if symptoms worsen or fail to improve.   Please note this report is partially produced by using speech recognition hardware. It may contain errors related to the system, including grammar, punctuation and spelling as well as words and phrases that may seem inaccurate. For any questions or concerns feel free to contact me for clarification       ________________________    Darrin Campos.  Waqar Warner APR, [address]  Office [phone]  Fax [phone]

## 2022-09-15 LAB
ALBUMIN SERPL-MCNC: 3.5 G/DL (ref 3.5–4.6)
ANION GAP SERPL CALCULATED.3IONS-SCNC: 7 MEQ/L (ref 9–15)
BUN BLDV-MCNC: 14 MG/DL (ref 8–23)
CALCIUM SERPL-MCNC: 8.9 MG/DL (ref 8.5–9.9)
CHLORIDE BLD-SCNC: 100 MEQ/L (ref 95–107)
CO2: 29 MEQ/L (ref 20–31)
CREAT SERPL-MCNC: 0.87 MG/DL (ref 0.5–0.9)
GFR AFRICAN AMERICAN: >60
GFR NON-AFRICAN AMERICAN: >60
GLUCOSE BLD-MCNC: 93 MG/DL (ref 70–99)
HCT VFR BLD CALC: 35.6 % (ref 37–47)
HEMOGLOBIN: 11.9 G/DL (ref 12–16)
MCH RBC QN AUTO: 31.1 PG (ref 27–31.3)
MCHC RBC AUTO-ENTMCNC: 33.5 % (ref 33–37)
MCV RBC AUTO: 92.9 FL (ref 82–100)
PDW BLD-RTO: 17.2 % (ref 11.5–14.5)
PHOSPHORUS: 3.5 MG/DL (ref 2.3–4.8)
PLATELET # BLD: 283 K/UL (ref 130–400)
POTASSIUM SERPL-SCNC: 3 MEQ/L (ref 3.4–4.9)
RBC # BLD: 3.84 M/UL (ref 4.2–5.4)
SODIUM BLD-SCNC: 136 MEQ/L (ref 135–144)
WBC # BLD: 5.5 K/UL (ref 4.8–10.8)

## 2022-09-16 LAB
ANION GAP SERPL CALCULATED.3IONS-SCNC: 13 MEQ/L (ref 9–15)
BUN BLDV-MCNC: 14 MG/DL (ref 8–23)
CALCIUM SERPL-MCNC: 8.9 MG/DL (ref 8.5–9.9)
CHLORIDE BLD-SCNC: 103 MEQ/L (ref 95–107)
CO2: 25 MEQ/L (ref 20–31)
CREAT SERPL-MCNC: 0.8 MG/DL (ref 0.5–0.9)
GFR AFRICAN AMERICAN: >60
GFR NON-AFRICAN AMERICAN: >60
GLUCOSE BLD-MCNC: 84 MG/DL (ref 70–99)
POTASSIUM SERPL-SCNC: 3.2 MEQ/L (ref 3.4–4.9)
SODIUM BLD-SCNC: 141 MEQ/L (ref 135–144)
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
## 2022-09-17 PROBLEM — R52 PAIN: Status: RESOLVED | Noted: 2022-08-18 | Resolved: 2022-09-17

## 2022-09-19 LAB
ANION GAP SERPL CALCULATED.3IONS-SCNC: 8 MEQ/L (ref 9–15)
BUN BLDV-MCNC: 14 MG/DL (ref 8–23)
CALCIUM SERPL-MCNC: 8.8 MG/DL (ref 8.5–9.9)
CHLORIDE BLD-SCNC: 108 MEQ/L (ref 95–107)
CO2: 24 MEQ/L (ref 20–31)
CREAT SERPL-MCNC: 0.87 MG/DL (ref 0.5–0.9)
GFR AFRICAN AMERICAN: >60
GFR NON-AFRICAN AMERICAN: >60
GLUCOSE BLD-MCNC: 96 MG/DL (ref 70–99)
POTASSIUM SERPL-SCNC: 4.4 MEQ/L (ref 3.4–4.9)
SODIUM BLD-SCNC: 140 MEQ/L (ref 135–144)

## 2022-09-19 NOTE — PROGRESS NOTES
[facility]  8/22/2022    Tj Brown  is a 80 y.o. in the NF being seen for a acute visit for   Chief Complaint   Patient presents with    Abnormal Test Results    Fatigue       HPI Patient being seen today for elevated liver function tests and fatigue. Patient reports she is feeling better but she is tired. They are eating and drinking at their baseline per nursing. They have had no recent falls with injuries. They are not complaining of any un addressed pain issues. Have had no recent choking or dysphagia issues. NO agitation or unusual mental behavioral issues. No past medical history on file. No past surgical history on file. No family history on file. Social History     Socioeconomic History    Marital status:      Spouse name: Not on file    Number of children: Not on file    Years of education: Not on file    Highest education level: Not on file   Occupational History    Not on file   Tobacco Use    Smoking status: Former    Smokeless tobacco: Former   Substance and Sexual Activity    Alcohol use: Not Currently    Drug use: Not on file    Sexual activity: Not on file   Other Topics Concern    Not on file   Social History Narrative    Not on file     Social Determinants of Health     Financial Resource Strain: Not on file   Food Insecurity: Not on file   Transportation Needs: Not on file   Physical Activity: Inactive    Days of Exercise per Week: 0 days    Minutes of Exercise per Session: 0 min   Stress: Not on file   Social Connections: Not on file   Intimate Partner Violence: Not on file   Housing Stability: Not on file       Allergies: Patient has no known allergies. NF MEDICATIONS REVIEWED    ROS:   Constitutional: There are no reports of behavioral issues, change in appetite, fever, or increased weakness. No bleeding issues.   Respiratory: denies SOB, dyspnea, dyspnea on exertion  Cardiovascular: denies CP, lightheadedness, palpitations, PND, orthopnea, or claudication. GI: No N/V/D. : no reports of dysuria, frequency or urgency  Extremities: No reports of pain issues, edema  Psych: at baseline cognition    Physical exam:   There were no vitals taken for this visit. Constitutional: Awake and alert sitting up in recliner, general weakness  HEENT: Normocephalic, hard of hearing,intact facial symmetry, conjunctiva pink, sclera icteric,  buccal mucosa pink and moist  Speech clear. NECK: - no cervical or clavicular lymphadenopathy, euthyroid, no mass visualized  Cardiovascular: Regular rate, and rhythm. No murmurs, gallops or rubs noted. Respiratory: LCTA, even unlabored respirations, no accessory muscle use noted  GI: abdomen NT, +BS x 4 Q, ND  : no suprapubic tenderness  Extremities: trace edema BLE, PPP  SKELETAL: no redness. Swelling over left knee joint. Limited ROM but spontaneous movement x 4   Psych: pleasantly confused, good judgement, normal thought content  SKIN: no gross skin lesions noted on visualized skin, warm and dry,jaundiced    ASSESSMENT:     Diagnosis Orders   1. Elevated LFTs        2. Fatigue, unspecified type            PLAN:  Pt/POA agrees with POC   CMP, CBC with diff in am    Return in about 1 week (around 8/29/2022), or if symptoms worsen or fail to improve. Please note this report is partially produced by using speech recognition hardware. It may contain errors related to the system, including grammar, punctuation and spelling as well as words and phrases that may seem inaccurate. For any questions or concerns feel free to contact me for clarification       ________________________    Meme Leija.  Yas Herring [facility], [address]  Office [phone]  Fax [phone]

## 2022-09-20 NOTE — PROGRESS NOTES
Patient Name: Danna Lawrence    YOB: 1940  Medical Record Number: [id number]    History of Present Illness: This is 79-year-old woman well-known to our service readmitted to facility after recent hospitalization. He had a functional decline as well as increasing confusion patient did suffer a fall patient was hospitalized and evidence of a left knee hematoma patient did require packed red cell transfusion was evaluated clinically patient did undergo neurological evaluation. He had escalating agitation but improved hospitalist was seen by psychiatry she was stabilized did undergo course physical therapy outpatient therapy she was managed appropriately in that setting simply transferred here for ongoing course of care. Today she is at her baseline confused has only intermittent discomfort in her left knee otherwise functionally at her baseline. Review of Systems   Unable to perform ROS: Dementia   All other systems reviewed and are negative. Review of Systems: All 14 review of systems negative other than as stated above    Social History     Tobacco Use    Smoking status: Former    Smokeless tobacco: Former   Substance Use Topics    Alcohol use: Not Currently         No past medical history on file. No past surgical history on file. Current Outpatient Medications on File Prior to Visit   Medication Sig Dispense Refill    insulin lispro, 1 Unit Dial, 100 UNIT/ML SOPN Inject 0-10 Units into the skin 3 times daily Indications: Type 2 Diabetes Inject as per sliding scale: -794=1c; 201-250=4u; 251-300=6u; 301-350=8u; 351-400=10u. Notify MD/NP if BS <60 or >400. Lactobacillus (ACIDOPHILUS) CAPS capsule Take 1 capsule by mouth in the morning. Indications: Nutritional Support. escitalopram (LEXAPRO) 10 MG tablet Take 10 mg by mouth in the morning. Indications: Depression. furosemide (LASIX) 20 MG tablet Take 20 mg by mouth in the morning.  Indications: High Blood General: Bowel sounds are normal.      Palpations: Abdomen is soft. Tenderness: There is no abdominal tenderness. Musculoskeletal:         General: Swelling present. Cervical back: Neck supple. No tenderness. Comments: Left knee hematoma   Skin:     General: Skin is dry. Findings: Bruising present. Neurological:      Mental Status: Mental status is at baseline. She is disoriented. Motor: Weakness present. Psychiatric:      Comments: Confused and agitated easily       There were no vitals taken for this visit. .   Lab Results   Component Value Date    WBC 5.5 09/18/2022    HGB 10.3 (L) 09/18/2022    HCT 32.8 (L) 09/18/2022    MCV 96 09/18/2022     09/18/2022     Lab Results   Component Value Date/Time     09/19/2022 06:14 PM    K 4.4 09/19/2022 06:14 PM     09/19/2022 06:14 PM    CO2 24 09/19/2022 06:14 PM    BUN 14 09/19/2022 06:14 PM    CREATININE 0.87 09/19/2022 06:14 PM    GLUCOSE 96 09/19/2022 06:14 PM    GLUCOSE 76 09/18/2022 07:00 AM    CALCIUM 8.8 09/19/2022 06:14 PM                ASSESSMENT:  Patient Active Problem List   Diagnosis    Dementia (HCC)    Unspecified atrial fibrillation (HCC)    Malignant neoplasm of right kidney, except renal pelvis (HCC)    MONY (obstructive sleep apnea)    Severe obesity (BMI 35.0-39. 9)    Age-related osteoporosis without current pathological fracture    Congenital hypothyroidism without goiter    Other hyperlipidemia    Gastroesophageal reflux disease without esophagitis    Abdominal wall hernia    Edema of both lower extremities    Acquired absence of both cervix and uterus    Acute kidney failure, unspecified (HCC)    BCC (basal cell carcinoma of skin)    Cerebral infarction (HCC)    Clear cell renal cell carcinoma, right (HCC)    Common bile duct dilation    Controlled type 2 diabetes mellitus without complication, without long-term current use of insulin (HCC)    Contusion of face    COVID-19    Endometrial cancer ([facility] [address]    Erythema of periwound skin    Essential hypertension    Frequency of micturition    Hepatic steatosis    Hyperthyroidism    Hypokalemia    Hypo-osmolality and hyponatremia    Impaired glucose tolerance    Klebsiella pneumoniae (k. pneumoniae) as the cause of diseases classified elsewhere    Localized edema    Memory loss    Hepatic encephalopathy (HCC)    Murmur    Nonspecific abnormal results of thyroid function study    Non-traumatic rhabdomyolysis    Nutritional anemia    Other specified abnormalities of plasma proteins    Personal history of COVID-19    Pre-diabetes    Renal artery pseudoaneurysm (HCC)    Renal cell carcinoma (HCC)    Symptoms involving urinary system    Syncope and collapse    Unspecified right bundle-branch block    Urinary tract infection, site not specified    Ataxia    Anemia    Hematoma of left knee region    Elevated LFTs         PLAN:   Diagnosis Orders   1. Hematoma of left knee region        2. Renal cell carcinoma, unspecified laterality ([address]        3. Syncope and collapse        4. Controlled type 2 diabetes mellitus without complication, without long-term current use of insulin ([address]        5. Vascular dementia with behavior disturbance (HCC)            Physical therapy aqua therapy nutritional support follow-up BMP CBC pending monitoring further hematoma of her prior extension goal of maximizing functional status return to community if possible assisted living versus remaining in long-term placement for dementia. Continue with current dementia medications may titrate as needed encourage nutritional intake and fluid intake to limit potential for infectious con commitment issues. Reorientation as able. Limit psychoactive agents as able. Please note orders entered on site at facility after discussion with appropriate facility nursing/therapy/ / nutritional staff. Current longstanding medical problems and acute medical issues addressed with staff.  Available data and data elements in on site paper chart reviewed and analyzed. Current external consultant notes reviewed in on site chart. Ordered laboratory testing and imaging will be reviewed when available. This patient's need for psychiatric medication has been reviewed. Will consider further adjustment and possible further evaluations by mental health services.

## 2022-09-21 LAB — DIABETIC RETINOPATHY: NEGATIVE

## 2022-09-22 ENCOUNTER — OFFICE VISIT (OUTPATIENT)
Dept: GERIATRIC MEDICINE | Age: 82
End: 2022-09-22
Payer: MEDICARE

## 2022-09-22 DIAGNOSIS — C64.1 CLEAR CELL RENAL CELL CARCINOMA, RIGHT (HCC): ICD-10-CM

## 2022-09-22 DIAGNOSIS — I10 ESSENTIAL HYPERTENSION: ICD-10-CM

## 2022-09-22 DIAGNOSIS — R27.0 ATAXIA: Primary | ICD-10-CM

## 2022-09-22 DIAGNOSIS — E11.9 CONTROLLED TYPE 2 DIABETES MELLITUS WITHOUT COMPLICATION, WITHOUT LONG-TERM CURRENT USE OF INSULIN (HCC): ICD-10-CM

## 2022-09-22 PROCEDURE — 1123F ACP DISCUSS/DSCN MKR DOCD: CPT | Performed by: INTERNAL MEDICINE

## 2022-09-22 PROCEDURE — 99305 1ST NF CARE MODERATE MDM 35: CPT | Performed by: INTERNAL MEDICINE

## 2022-09-22 PROCEDURE — 3044F HG A1C LEVEL LT 7.0%: CPT | Performed by: INTERNAL MEDICINE

## 2022-09-23 LAB
BILIRUBIN URINE: NEGATIVE
BLOOD, URINE: NEGATIVE
CLARITY: CLEAR
COLOR: YELLOW
GLUCOSE URINE: NEGATIVE MG/DL
KETONES, URINE: NEGATIVE MG/DL
LEUKOCYTE ESTERASE, URINE: NEGATIVE
NITRITE, URINE: NEGATIVE
PH UA: 5 (ref 5–9)
PROTEIN UA: NEGATIVE MG/DL
SPECIFIC GRAVITY UA: 1.01 (ref 1–1.03)
UROBILINOGEN, URINE: 0.2 E.U./DL

## 2022-09-24 NOTE — PROGRESS NOTES
Nursing Home:  [address] [place] [address] Road    The patient is being seen today for discharge summary:  Chief Complaint   Patient presents with    Other     Discharge to AL         SUBJECTIVE: Patient being seen today for discharge summary. Patient was originally admitted to this facility with Fall, hematoma left knee, A. fib, DM type II controlled, hepatic encephalopathy, syncope and collapse, nutritional anemia, localized edema, essential hypertension. On follow-up lab work resident was noted to have elevated LFTs and was further diagnosed with common bile duct dilation. Patient continues to be followed by both surgery and gastroenterology. She has completed her therapy and is now ready to be discharged back to her assisted living. FAMILY AND SOCIAL HISTORY:  Refer to H&P. No past medical history on file. No family history on file. Social History     Socioeconomic History    Marital status:      Spouse name: Not on file    Number of children: Not on file    Years of education: Not on file    Highest education level: Not on file   Occupational History    Not on file   Tobacco Use    Smoking status: Former    Smokeless tobacco: Former   Substance and Sexual Activity    Alcohol use: Not Currently    Drug use: Not on file    Sexual activity: Not on file   Other Topics Concern    Not on file   Social History Narrative    Not on file     Social Determinants of Health     Financial Resource Strain: Not on file   Food Insecurity: Not on file   Transportation Needs: Not on file   Physical Activity: Inactive    Days of Exercise per Week: 0 days    Minutes of Exercise per Session: 0 min   Stress: Not on file   Social Connections: Not on file   Intimate Partner Violence: Not on file   Housing Stability: Not on file       Patient has no known allergies.     MEDICATIONS:  Acidophilus capsule 1 capsule p.o. daily, Admelog Solostar 100 unit/mL solution pen injector inject as per sliding scale, carvedilol 25 mg tab p.o. twice daily, donepezil 10 mg tab p.o. daily, Dulcolax suppository 10 mg rectal suppository daily as needed if no relief from milk of mag, escitalopram oxalate 10 mg tab 1 tab p.o. daily, Januvia 100 mg tab p.o. daily, Lasix 20 mg tab p.o. daily, melatonin 3 mg tab p.o. daily, memantine 10 mg tab p.o. twice daily, milk of magnesia suspension 1200 mg per 15 mL give 30 mL p.o. daily as needed, montelukast sodium tablet chewable 5 mg tab p.o. daily, Seroquel 25 mg tab give one half tab p.o. daily, telmisartan-HCTZ tablet 80-12.5 mg tab p.o. daily, Tylenol 325 mg tab 2 tabs p.o. every 4 as needed for elevated temp greater than 100 or pain not to exceed 3 g in 24 hours, vitamin B12 extended release tab 1 tab p.o. daily, Zofran ODT 4 mg disintegrating tablet p.o. every 6 as needed x14 days. REVIEW OF SYSTEMS:  Resident denies any headache, dizziness, blurred vision. She denies any fever, chills, sore throat. She denies any rashes, bruises, or open areas. She denies any chest pain, chest discomfort, or heart palpitations. She denies any shortness of breath or cough. She denies any nausea, vomiting, or abdominal pain. She denies any urinary urgency, frequency, or dysuria. She denies any constipation or diarrhea. She states she is eating okay, sleeping okay, and she currently has no pain. PHYSICAL EXAMINATION:  Most recent vital signs: Blood pressure 122/78, temp 98.1, heart rate 70, respirations 18, pulse ox 97% room air, weight 211.6 pounds. Constitutional:  Resident is a 80 y.o. female alert, pleasant, and cooperative with exam.  In no apparent distress. Integument: Slightly jaundiced, warm, dry. No visible rashes, bruises, or open areas. Nursing staff report resident has an open area on her bottom that is a stage III she is being followed by wound care for. HEENT:  Normocephalic, atraumatic. External ears appear to be within normal limits. Hard of hearing. Conjunctivae pink.   Sclerae with mild icteric. Mucous membranes pink, moist.  No oropharyngeal exudate. Neck:  Supple. No cervical or clavicular lymphadenopathy. No masses noted. Cardiovascular:  Heart rate regular rate and rhythm. No murmurs, gallops, rubs noted. Respiratory:  Lung sounds Normal.  Respirations nonlabored. The patient is not using accessory muscles with breathing. Current pulse ox 97% room air. Abdomen:  Soft, nontender, nondistended, normoactive bowel sounds. No masses noted. Musculoskeletal: No muscle tenderness. Left knee joint tenderness. Nonpitting edema left lower extremity chronic since admission. PV:  Peripheral pulses present. She  does have nonpitting edema to LLE, chronic since admission. Psychological: Mood stable. Affect pleasant. Speech normal rate and tone. ASSESSMENT AND PLAN:      Diagnosis Orders   1. Renal artery pseudoaneurysm ([address]        2. Atrial fibrillation, unspecified type ([address]        3. Controlled type 2 diabetes mellitus without complication, without long-term current use of insulin ([address]        4. Common bile duct dilation        5. Hepatic encephalopathy (HCC)        6. Vascular dementia with behavior disturbance (HCC)        7. Elevated LFTs        8. Syncope and collapse        9. Nutritional anemia        10. Localized edema        11. Essential hypertension             Patient urinating without incident she has not had any pain in her flank, or groin, or abdomen. BUN 12 creatinine 0.72. Currently in a regular rhythm. Her Eliquis is on hold due to hematoma on her knee related to her fall. She is rate controlled. She has not had any chest pain, chest discomfort, or heart palpitations she is aware of. Most recent hemoglobin A1c 4.4. Blood sugars ranging 90s to 150s. Continue Januvia as ordered. No complaints of abdominal pain. She is followed by general surgery as well as gastroenterology.   Patient is at her baseline during the day however closer to late afternoon early evening she does become behavioral.  I believe this may be related to her dementia and is more of a sundowning type disturbance. Patient mood stable. She has not had any further decrease in her cognition. She does attempt to leave with her  after he visits but is redirectable. Continue Aricept and Namenda as ordered. She is followed by neurology. Most recent ALT 46 AST 99 globulin 4.5 total bilirubin 7.5. She continues to be followed by gastroenterology as well as general surgery. I will of nursing staff fax her most recent labs to both. No syncopal episodes since her admission to this facility. Most recent hemoglobin 9.8, hematocrit 30.0. Appears to be trending upward we will continue to monitor. Patient's edema in her left lower extremity has been chronic since her admission to this facility. It has not worsened. She is followed by Dr. King Neither from orthopedics. Patient's blood pressure has remained stable she has not had any hypertensive or hypotensive events. We will continue her current antihypertensive medication regimen. I have reviewed this resident's medication and treatment plan as well as most recent lab work. Nursing staff report patient has a stage III wound on her bottom that she is being followed by wound nurse practitioner for. They will check with assisted living to see if they can manage this patient's wound when she returns to assisted living. If not she will be remaining in healthcare until her wound is at an acceptable level that assisted living can manage. When the patient is ready for discharge to assisted living nursing staff may call in a 2-week supply of medications to the pharmacy of her choice. Dr. Jj Sunshine will continue to follow the resident in assisted living. It was pleasure following this resident while she was at this facility. Greater than 30 minutes was spent in the discharge planning of this patient.   No further changes will be made at this time. Return for 1-2 weeks with pcp. Please note this report is partially produced by using speech recognition hardware. It may contain errors related to the system, including grammar, punctuation and spelling as well as words and phrases that may seem inaccurate. For any questions or concerns feel free to contact me for clarification        Electronically Signed By: Lynn Contreras. Gricelda Aguilar CNP on 9/23/22   ______________________________  Lynn Contreras.  Ian ELLIS CNP

## 2022-09-25 LAB — URINE CULTURE, ROUTINE: NORMAL

## 2022-09-28 ENCOUNTER — OFFICE VISIT (OUTPATIENT)
Dept: GERIATRIC MEDICINE | Age: 82
End: 2022-09-28
Payer: MEDICARE

## 2022-09-28 DIAGNOSIS — N17.9 AKI (ACUTE KIDNEY INJURY) (HCC): Primary | ICD-10-CM

## 2022-09-28 PROBLEM — R60.0 UNILATERAL EDEMA OF LOWER EXTREMITY: Status: ACTIVE | Noted: 2022-09-28

## 2022-09-28 PROBLEM — R17 JAUNDICE: Status: ACTIVE | Noted: 2022-09-28

## 2022-09-28 LAB — VALPROIC ACID LEVEL: 46.2 UG/ML (ref 50–100)

## 2022-09-28 PROCEDURE — 1123F ACP DISCUSS/DSCN MKR DOCD: CPT | Performed by: NURSE PRACTITIONER

## 2022-09-28 PROCEDURE — 99308 SBSQ NF CARE LOW MDM 20: CPT | Performed by: NURSE PRACTITIONER

## 2022-09-28 NOTE — PROGRESS NOTES
[facility]  9/9/2022    Kari Wilkinson  is a 80 y.o. in the [place] being seen for a f/u of   Chief Complaint   Patient presents with    Leg Swelling    Abnormal Test Results       HPI   Being seen today for leg swelling and elevated LFT's. .     No past medical history on file. No past surgical history on file. No family history on file. Social History     Socioeconomic History    Marital status:      Spouse name: Not on file    Number of children: Not on file    Years of education: Not on file    Highest education level: Not on file   Occupational History    Not on file   Tobacco Use    Smoking status: Former    Smokeless tobacco: Former   Substance and Sexual Activity    Alcohol use: Not Currently    Drug use: Not on file    Sexual activity: Not on file   Other Topics Concern    Not on file   Social History Narrative    Not on file     Social Determinants of Health     Financial Resource Strain: Not on file   Food Insecurity: Not on file   Transportation Needs: Not on file   Physical Activity: Inactive    Days of Exercise per Week: 0 days    Minutes of Exercise per Session: 0 min   Stress: Not on file   Social Connections: Not on file   Intimate Partner Violence: Not on file   Housing Stability: Not on file       Allergies: Patient has no known allergies. NF MEDICATIONS REVIEWED    ROS:  See HPI  Constitutional: There are no reports of behavioral issues, change in appetite, fever, or weakness. No gross bleeding issues. Respiratory: denies SOB, dyspnea, dyspnea on exertion,   Cardiovascular: denies CP, lightheadedness,   GI: No reports of change of bowel habits, no N/V/D/C. : no reports of change in bladder habits  Extremities: No reports of pain issues, no reports of increased edema    Physical exam:   Pressure 136/68, temp 97.6, heart rate 68, respirations 18, pulse ox 97% room air. Constitutional: Alert, elderly female. Sitting in recliner at the time of my visit.   In no apparent distress. HEENT: normocephalic, atraumatic, MMM, no cyanosis. NO neck mass visualized   Cardiovascular: Regular rate  Respiratory: unlabored respirations, no accessory muscle use noted  GI: abdomen ND  : no bladder tenderness  Extremities: no edema RLE, NP edema LLE,  Psych: pleasant and able to follow simple commands, normal thought content, speech clear    ASSESSMENT:     Diagnosis Orders   1. Edema of both lower extremities        2. Elevated LFTs            PLAN:   Agrees with POC   Patient's LFTs are improving. Direct bilirubin 1.7 was 5.6, indirect bilirubin 1.9 was 2.0, ALT 24 was 46 prior, AST 51 was 99 prior, globulin 4.2 was 4.5 prior. Nursing staff report that ultrasound was done and preliminary shows no DVT LLE. Patient does have follow-up scheduled with Dr. Sommer Hubbard from orthopedics. Advised nurse and family to remind patient to keep her legs elevated when in seated position or in bed. Return in about 1 week (around 9/16/2022), or if symptoms worsen or fail to improve. Pertinent POC, labs, have been reviewed, continue same. Encourage fluids and good nutrition. Stress fall prevention strategies. Nursing to notify Pt/POA for agreement to POC         ________________________    Lynn Contreras. Fredy Stefano [facility], [address], [address] [place] [address]  Office [phone]  Fax [phone]      Please note this report is partially produced by using speech recognition hardware. It may contain errors related to the system, including grammar, punctuation and spelling as well as words and phrases that may seem inaccurate.   For any questions or concerns feel free to contact me for clarification

## 2022-09-29 NOTE — PROGRESS NOTES
[facility]  9/7/2022    Eduard Cotter  is a 80 y.o. in the NF being seen for a acute visit for   Chief Complaint   Patient presents with    Jaundice    Leg Swelling       HPI patient being seen today for acute visit for jaundice and leg swelling. They are eating and drinking at their baseline per nursing. They have had no recent falls with injuries. They are not complaining of any un addressed pain issues. Have had no recent choking or dysphagia issues. NO agitation or unusual mental behavioral issues. No past medical history on file. No past surgical history on file. No family history on file. Social History     Socioeconomic History    Marital status:      Spouse name: Not on file    Number of children: Not on file    Years of education: Not on file    Highest education level: Not on file   Occupational History    Not on file   Tobacco Use    Smoking status: Former    Smokeless tobacco: Former   Substance and Sexual Activity    Alcohol use: Not Currently    Drug use: Not on file    Sexual activity: Not on file   Other Topics Concern    Not on file   Social History Narrative    Not on file     Social Determinants of Health     Financial Resource Strain: Not on file   Food Insecurity: Not on file   Transportation Needs: Not on file   Physical Activity: Inactive    Days of Exercise per Week: 0 days    Minutes of Exercise per Session: 0 min   Stress: Not on file   Social Connections: Not on file   Intimate Partner Violence: Not on file   Housing Stability: Not on file       Allergies: Patient has no known allergies. NF MEDICATIONS REVIEWED    ROS:   Constitutional: There are no reports of behavioral issues, change in appetite, fever, or increased weakness. No bleeding issues. Respiratory: denies SOB, dyspnea, dyspnea on exertion  Cardiovascular: denies CP, lightheadedness, palpitations, PND, orthopnea, or claudication. GI: No N/V/D.    : no reports of dysuria, frequency or urgency  Extremities: No reports of pain issues, edema  Psych: at baseline cognition    Physical exam:   Blood pressure 166/78, temp 97.8, heart rate 70, respirations 18, pulse ox 97%, weight 202.4 pounds. Constitutional: Awake and alert sitting up in recliner, general weakness  HEENT: Normocephalic, atraumatic, conjunctiva pink, sclera non icteric,  buccal mucosa pink and moist  Speech clear. NECK: - no cervical or clavicular lymphadenopathy, euthyroid, no mass visualized  Cardiovascular: Regular rate, and rhythm. No murmurs, gallops or rubs noted. Respiratory: LCTA, even unlabored respirations, no accessory muscle use noted  GI: abdomen NT, +BS x 4 Q, ND  : no suprapubic tenderness  Extremities: unilateral edema LLE,  PPP  SKELETAL: no redness, or swelling over joints. Limited ROM but spontaneous movement x 4   Psych: pleasantly confused, needs re-directed intermittently,good judgement, normal thought content  SKIN: no gross skin lesions noted on visualized skin, warm and dry, slight jaundice    ASSESSMENT:     Diagnosis Orders   1. Jaundice        2. Unilateral edema of lower extremity            PLAN:  Pt/POA agrees with POC   Repeat LFT's  US LLE, nonweight bearing until results received    Return in about 1 week (around 9/14/2022), or if symptoms worsen or fail to improve. Please note this report is partially produced by using speech recognition hardware. It may contain errors related to the system, including grammar, punctuation and spelling as well as words and phrases that may seem inaccurate.   For any questions or concerns feel free to contact me for clarification       Perlita Paz, RHONDA - CNP

## 2022-10-03 LAB
AMMONIA: 25 UMOL/L (ref 11–51)
HCT VFR BLD CALC: 40.5 % (ref 37–47)
HEMOGLOBIN: 13.1 G/DL (ref 12–16)
MCH RBC QN AUTO: 30.5 PG (ref 27–31.3)
MCHC RBC AUTO-ENTMCNC: 32.5 % (ref 33–37)
MCV RBC AUTO: 93.9 FL (ref 82–100)
PDW BLD-RTO: 15.6 % (ref 11.5–14.5)
PLATELET # BLD: 233 K/UL (ref 130–400)
RBC # BLD: 4.31 M/UL (ref 4.2–5.4)
VALPROIC ACID LEVEL: 40.6 UG/ML (ref 50–100)
WBC # BLD: 4.7 K/UL (ref 4.8–10.8)

## 2022-10-04 LAB
ALBUMIN SERPL-MCNC: 3.2 G/DL (ref 3.5–4.6)
ALP BLD-CCNC: 54 U/L (ref 40–130)
ALT SERPL-CCNC: 15 U/L (ref 0–33)
ANION GAP SERPL CALCULATED.3IONS-SCNC: 8 MEQ/L (ref 9–15)
AST SERPL-CCNC: 35 U/L (ref 0–35)
BILIRUB SERPL-MCNC: 1.1 MG/DL (ref 0.2–0.7)
BUN BLDV-MCNC: 15 MG/DL (ref 8–23)
CALCIUM SERPL-MCNC: 8.9 MG/DL (ref 8.5–9.9)
CHLORIDE BLD-SCNC: 105 MEQ/L (ref 95–107)
CO2: 25 MEQ/L (ref 20–31)
CREAT SERPL-MCNC: 0.89 MG/DL (ref 0.5–0.9)
GFR AFRICAN AMERICAN: >60
GFR NON-AFRICAN AMERICAN: >60
GLOBULIN: 4.1 G/DL (ref 2.3–3.5)
GLUCOSE BLD-MCNC: 49 MG/DL (ref 70–99)
HCT VFR BLD CALC: 39.6 % (ref 37–47)
HEMOGLOBIN: 12.9 G/DL (ref 12–16)
MCH RBC QN AUTO: 30.8 PG (ref 27–31.3)
MCHC RBC AUTO-ENTMCNC: 32.6 % (ref 33–37)
MCV RBC AUTO: 94.3 FL (ref 82–100)
PDW BLD-RTO: 16.1 % (ref 11.5–14.5)
PHOSPHORUS: 3.3 MG/DL (ref 2.3–4.8)
PLATELET # BLD: 230 K/UL (ref 130–400)
POTASSIUM SERPL-SCNC: 4.4 MEQ/L (ref 3.4–4.9)
RBC # BLD: 4.19 M/UL (ref 4.2–5.4)
SODIUM BLD-SCNC: 138 MEQ/L (ref 135–144)
TOTAL PROTEIN: 7.3 G/DL (ref 6.3–8)
WBC # BLD: 5.4 K/UL (ref 4.8–10.8)

## 2022-10-05 ENCOUNTER — OFFICE VISIT (OUTPATIENT)
Dept: GERIATRIC MEDICINE | Age: 82
End: 2022-10-05
Payer: MEDICARE

## 2022-10-05 DIAGNOSIS — R63.4 ABNORMAL WEIGHT LOSS: Primary | ICD-10-CM

## 2022-10-05 PROCEDURE — 1123F ACP DISCUSS/DSCN MKR DOCD: CPT | Performed by: NURSE PRACTITIONER

## 2022-10-05 PROCEDURE — 99308 SBSQ NF CARE LOW MDM 20: CPT | Performed by: NURSE PRACTITIONER

## 2022-10-13 ENCOUNTER — OFFICE VISIT (OUTPATIENT)
Dept: GERIATRIC MEDICINE | Age: 82
End: 2022-10-13
Payer: MEDICARE

## 2022-10-13 DIAGNOSIS — R60.0 EDEMA OF BOTH LOWER EXTREMITIES: Primary | ICD-10-CM

## 2022-10-13 PROCEDURE — 1123F ACP DISCUSS/DSCN MKR DOCD: CPT | Performed by: NURSE PRACTITIONER

## 2022-10-13 PROCEDURE — 99309 SBSQ NF CARE MODERATE MDM 30: CPT | Performed by: NURSE PRACTITIONER

## 2022-10-17 NOTE — PROGRESS NOTES
Patient Name: Lindy Smith    YOB: 1940  Medical Record Number: [id number]        History of Present Illness:  74-year-old woman admitted here after recent    Ablation patient has had history dementia with agitation and confusion patient had been poorly emptying in the past patient has had functional weakness patient had recent falls did undergo imaging which confirmed evidence of no acute fracture patient has been cognitively at her baseline patient did undergo inpatient evaluation subsequent skilled rehab stay at this time. Patient is at her baseline confused without evidence acute pain crisis no finding coherent duration family is present at bedside. Review of Systems    Review of Systems: All 14 review of systems negative other than as stated above    Social History     Tobacco Use    Smoking status: Former    Smokeless tobacco: Former   Substance Use Topics    Alcohol use: Not Currently         No past medical history on file. No past surgical history on file. Current Outpatient Medications on File Prior to Visit   Medication Sig Dispense Refill    acetaminophen (TYLENOL) 325 MG tablet Take by mouth      insulin lispro, 1 Unit Dial, 100 UNIT/ML SOPN Inject 0-10 Units into the skin 3 times daily Indications: Type 2 Diabetes Inject as per sliding scale: -026=3f; 201-250=4u; 251-300=6u; 301-350=8u; 351-400=10u. Notify MD/NP if BS <60 or >400. Lactobacillus (ACIDOPHILUS) CAPS capsule Take 1 capsule by mouth in the morning. Indications: Nutritional Support. escitalopram (LEXAPRO) 10 MG tablet Take 10 mg by mouth in the morning. Indications: Depression. furosemide (LASIX) 20 MG tablet Take 20 mg by mouth in the morning. Indications: High Blood Pressure Disorder. melatonin 3 MG TABS tablet Take 3 mg by mouth nightly Indications: Trouble Sleeping      memantine (NAMENDA) 10 MG tablet Take 10 mg by mouth in the morning and 10 mg before bedtime.  Indications: Decline in Cognition due to a Brain Disease. QUEtiapine (SEROQUEL) 25 MG tablet Take 12.5 mg by mouth in the morning. Indications: Behavioral Disorders associated with Dementia. telmisartan-hydroCHLOROthiazide (MICARDIS HCT) 80-12.5 MG per tablet Take 1 tablet by mouth in the morning. Indications: High Blood Pressure Disorder. ondansetron (ZOFRAN-ODT) 4 MG disintegrating tablet Take 4 mg by mouth every 6 hours as needed for Nausea or Vomiting      carvedilol (COREG) 25 MG tablet Take 25 mg by mouth 2 times daily (with meals) Indications: High Blood Pressure Disorder      donepezil (ARICEPT) 10 MG tablet Take 10 mg by mouth nightly Indications: Decline in Cognition due to a Brain Disease      SITagliptin (JANUVIA) 100 MG tablet Take 100 mg by mouth daily Indications: Type 2 Diabetes      montelukast (SINGULAIR) 5 MG chewable tablet Take 5 mg by mouth nightly Indications: Asthma      cyanocobalamin 1000 MCG tablet Take 1,000 mcg by mouth daily Indications: Nutritional Support       No current facility-administered medications on file prior to visit. No Known Allergies      No family history on file. Physical Exam:      Physical Exam  Vitals and nursing note reviewed. Constitutional:       Appearance: Normal appearance. She is normal weight. HENT:      Head: Atraumatic. Nose: Nose normal.      Mouth/Throat:      Mouth: Mucous membranes are moist.   Eyes:      Extraocular Movements: Extraocular movements intact. Cardiovascular:      Rate and Rhythm: Regular rhythm. Pulses: Normal pulses. Heart sounds: Normal heart sounds. Pulmonary:      Effort: Pulmonary effort is normal.      Breath sounds: Wheezing present. Abdominal:      General: Bowel sounds are normal.      Tenderness: There is abdominal tenderness. Musculoskeletal:         General: Swelling present. Cervical back: Neck supple. Skin:     General: Skin is warm.    Neurological:      Mental Status: She is disoriented. Motor: Weakness present. Gait: Gait abnormal.       There were no vitals taken for this visit. .   Lab Results   Component Value Date    WBC 5.4 10/04/2022    HGB 12.9 10/04/2022    HCT 39.6 10/04/2022    MCV 94.3 10/04/2022     10/04/2022     Lab Results   Component Value Date/Time     10/04/2022 10:06 AM    K 4.4 10/04/2022 10:06 AM     10/04/2022 10:06 AM    CO2 25 10/04/2022 10:06 AM    BUN 15 10/04/2022 10:06 AM    CREATININE 0.89 10/04/2022 10:06 AM    GLUCOSE 49 10/04/2022 10:06 AM    GLUCOSE 76 09/18/2022 07:00 AM    CALCIUM 8.9 10/04/2022 10:06 AM                ASSESSMENT:  Patient Active Problem List   Diagnosis    Dementia ([facility]    Unspecified atrial fibrillation (HCC)    Malignant neoplasm of right kidney, except renal pelvis (HCC)    MONY (obstructive sleep apnea)    Severe obesity (BMI 35.0-39. 9)    Age-related osteoporosis without current pathological fracture    Congenital hypothyroidism without goiter    Other hyperlipidemia    Gastroesophageal reflux disease without esophagitis    Abdominal wall hernia    Edema of both lower extremities    Acquired absence of both cervix and uterus    Acute kidney failure, unspecified (HCC)    BCC (basal cell carcinoma of skin)    Cerebral infarction (HCC)    Clear cell renal cell carcinoma, right (HCC)    Common bile duct dilation    Controlled type 2 diabetes mellitus without complication, without long-term current use of insulin (HCC)    Contusion of face    COVID-19    Endometrial cancer (HCC)    Erythema of periwound skin    Essential hypertension    Frequency of micturition    Hepatic steatosis    Hyperthyroidism    Hypokalemia    Hypo-osmolality and hyponatremia    Impaired glucose tolerance    Klebsiella pneumoniae (k. pneumoniae) as the cause of diseases classified elsewhere    Localized edema    Memory loss    Hepatic encephalopathy    Murmur    Nonspecific abnormal results of thyroid function study Non-traumatic rhabdomyolysis    Nutritional anemia    Other specified abnormalities of plasma proteins    Personal history of COVID-19    Pre-diabetes    Renal artery pseudoaneurysm (HCC)    Renal cell carcinoma (HCC)    Symptoms involving urinary system    Syncope and collapse    Unspecified right bundle-branch block    Urinary tract infection, site not specified    Ataxia    Anemia    Hematoma of left knee region    Elevated LFTs    Jaundice    Unilateral edema of lower extremity         PLAN:   Diagnosis Orders   1. Ataxia        2. Essential hypertension        3. Clear cell renal cell carcinoma, right ([address]        4. Controlled type 2 diabetes mellitus without complication, without long-term current use of insulin [facility])          Physical therapy outpatient records protocol vaccination functional status patient is functionally weak at this time. Reorientation as able to limit psychoactive agents if possible. Visual support skin surveillance. Monitor blood sugars closely patient denice high risk for further delirium and falls precautions are in place at this time. For falls. Please note orders entered on site at facility after discussion with appropriate facility nursing/therapy/ / nutritional staff. Current longstanding medical problems and acute medical issues addressed with staff. Available data and data elements in on site paper chart reviewed and analyzed. Current external consultant notes reviewed in on site chart. Ordered laboratory testing and imaging will be reviewed when available. This patient's need for psychiatric medication has been reviewed. Will consider further adjustment and possible further evaluations by mental health services.

## 2022-10-18 PROBLEM — N17.9 AKI (ACUTE KIDNEY INJURY) (HCC): Status: ACTIVE | Noted: 2022-01-01

## 2022-10-19 NOTE — PROGRESS NOTES
[facility]  9/28/2022    Nacho Putnam  is a 80 y.o. in the NF being seen for a f/u of   Chief Complaint   Patient presents with    Other     GUNNAR       HPI   Being seen today for GUNNAR. No past medical history on file. No past surgical history on file. No family history on file. Social History     Socioeconomic History    Marital status:      Spouse name: Not on file    Number of children: Not on file    Years of education: Not on file    Highest education level: Not on file   Occupational History    Not on file   Tobacco Use    Smoking status: Former    Smokeless tobacco: Former   Substance and Sexual Activity    Alcohol use: Not Currently    Drug use: Not on file    Sexual activity: Not on file   Other Topics Concern    Not on file   Social History Narrative    Not on file     Social Determinants of Health     Financial Resource Strain: Not on file   Food Insecurity: Not on file   Transportation Needs: Not on file   Physical Activity: Inactive    Days of Exercise per Week: 0 days    Minutes of Exercise per Session: 0 min   Stress: Not on file   Social Connections: Not on file   Intimate Partner Violence: Not on file   Housing Stability: Not on file       Allergies: Patient has no known allergies. NF MEDICATIONS REVIEWED    ROS:  See HPI  Constitutional: There are no reports of behavioral issues, change in appetite, fever, or weakness. No gross bleeding issues. Respiratory: denies SOB, dyspnea, dyspnea on exertion,   Cardiovascular: denies CP, lightheadedness,   GI: No reports of change of bowel habits, no N/V/D/C. : no reports of change in bladder habits  Extremities: No reports of pain issues, no edema    Physical exam:  /68, temperature 97.8, heart rate 80, respirations 18, spo2 96%, weight 201.8lbs. Constitutional: Alert, elderly female, sitting up in bed, in no apparent distress  HEENT: normocephalic, [place],  MMM, no cyanosis.  NO neck mass visualized Cardiovascular: Regular rate  Respiratory: unlabored respirations, no accessory muscle use noted  GI: abdomen ND  : no bladder tenderness  Extremities: nonpitting edema L<R, PPP  Psych: pleasantly confused and able to follow simple commands, normal thought content, speech clear    ASSESSMENT:     Diagnosis Orders   1. GUNNAR (acute kidney injury) ([facility]            PLAN:   Agrees with POC   Most recent BUN 14, creat 0.87  Return in about 1 week (around 10/5/2022), or if symptoms worsen or fail to improve. Pertinent POC, labs, have been reviewed, continue same. Encourage fluids and good nutrition. Stress fall prevention strategies. Nursing to notify Pt/POA for agreement to POC         ________________________    Trixie Thompson. [facility], [address]  Office [phone]  Fax [phone]      Please note this report is partially produced by using speech recognition hardware. It may contain errors related to the system, including grammar, punctuation and spelling as well as words and phrases that may seem inaccurate.   For any questions or concerns feel free to contact me for clarification

## 2022-10-27 ENCOUNTER — OFFICE VISIT (OUTPATIENT)
Dept: GERIATRIC MEDICINE | Age: 82
End: 2022-10-27
Payer: MEDICARE

## 2022-10-27 DIAGNOSIS — R55 SYNCOPE AND COLLAPSE: Primary | ICD-10-CM

## 2022-10-27 PROCEDURE — 1123F ACP DISCUSS/DSCN MKR DOCD: CPT | Performed by: NURSE PRACTITIONER

## 2022-10-27 PROCEDURE — 99308 SBSQ NF CARE LOW MDM 20: CPT | Performed by: NURSE PRACTITIONER

## 2022-10-31 PROBLEM — R63.4 ABNORMAL WEIGHT LOSS: Status: ACTIVE | Noted: 2022-10-31

## 2022-10-31 NOTE — PROGRESS NOTES
[facility]  10/5/2022    Radha Lopez  is a 80 y.o. in the NF being seen for a f/u of   Chief Complaint   Patient presents with    Weight Loss       HPI   Being seen today for abnormal weight loss. Nurse reports patient has lost 11.8lbs since 9/15/22. No past medical history on file. No past surgical history on file. No family history on file. Social History     Socioeconomic History    Marital status:      Spouse name: Not on file    Number of children: Not on file    Years of education: Not on file    Highest education level: Not on file   Occupational History    Not on file   Tobacco Use    Smoking status: Former    Smokeless tobacco: Former   Substance and Sexual Activity    Alcohol use: Not Currently    Drug use: Not on file    Sexual activity: Not on file   Other Topics Concern    Not on file   Social History Narrative    Not on file     Social Determinants of Health     Financial Resource Strain: Not on file   Food Insecurity: Not on file   Transportation Needs: Not on file   Physical Activity: Inactive    Days of Exercise per Week: 0 days    Minutes of Exercise per Session: 0 min   Stress: Not on file   Social Connections: Not on file   Intimate Partner Violence: Not on file   Housing Stability: Not on file       Allergies: Patient has no known allergies. NF MEDICATIONS REVIEWED    ROS:  See HPI  Constitutional: There are no reports of behavioral issues, change in appetite, fever, or weakness. No gross bleeding issues. Patient reports she was having stomach issues over the past few months and she feels as if she is eating better now. Respiratory: denies SOB, dyspnea, dyspnea on exertion,   Cardiovascular: denies CP, lightheadedness,   GI: No reports of change of bowel habits, no N/V/D/C.    : no reports of change in bladder habits  Extremities: No reports of pain issues, no edema    Physical exam:   /74, temperature 97.8, heart rate 76, respirations 18, spo2 96%, weight 190lbs  Constitutional: Alert, elderly, frail female in no apparent distress  HEENT: normocephalic, [place], MMM, no cyanosis. NO neck mass visualized   Cardiovascular: Regular rate  Respiratory: unlabored respirations, no accessory muscle use noted  GI: abdomen ND  : no bladder tenderness  Extremities: chronic edema BLE L>R. PPE  Psych: pleasant and able to follow simple commands, normal thought content, speech clear    ASSESSMENT:     Diagnosis Orders   1. Abnormal weight loss            PLAN:   Agrees with POC   Patient reports she is eating better now. She reports she was having stomach problems and was not eating well but feels better now. She reports she does not care for all of the food that is served but she eats what she can. We will continue to monitor. Patient followed by facility dietitian. Return in about 1 week (around 10/12/2022), or if symptoms worsen or fail to improve. Pertinent POC, labs, have been reviewed, continue same. Encourage fluids and good nutrition. Stress fall prevention strategies. Nursing to notify Pt/POA for agreement to POC         ________________________    Silvestre [place]. Margie Lombardi RHONDA, [address], [address] [place] [address]  Office [phone]  Fax [phone]      Please note this report is partially produced by using speech recognition hardware. It may contain errors related to the system, including grammar, punctuation and spelling as well as words and phrases that may seem inaccurate.   For any questions or concerns feel free to contact me for clarification

## 2022-11-02 ENCOUNTER — OFFICE VISIT (OUTPATIENT)
Dept: GERIATRIC MEDICINE | Age: 82
End: 2022-11-02
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
## 2022-11-02 LAB — VITAMIN D 25-HYDROXY: 34.1 NG/ML

## 2022-11-07 ENCOUNTER — OFFICE VISIT (OUTPATIENT)
Dept: GERIATRIC MEDICINE | Age: 82
End: 2022-11-07
Payer: MEDICARE

## 2022-11-07 DIAGNOSIS — E55.9 VITAMIN D DEFICIENCY: Primary | ICD-10-CM

## 2022-11-07 PROCEDURE — 1123F ACP DISCUSS/DSCN MKR DOCD: CPT | Performed by: NURSE PRACTITIONER

## 2022-11-07 PROCEDURE — 99308 SBSQ NF CARE LOW MDM 20: CPT | Performed by: NURSE PRACTITIONER

## 2022-11-11 LAB
BACTERIA: ABNORMAL /HPF
BILIRUBIN URINE: NEGATIVE
BLOOD, URINE: NEGATIVE
CLARITY: ABNORMAL
COLOR: ABNORMAL
EPITHELIAL CELLS, UA: ABNORMAL /HPF (ref 0–5)
GLUCOSE URINE: NEGATIVE MG/DL
HYALINE CASTS: ABNORMAL /HPF (ref 0–5)
KETONES, URINE: ABNORMAL MG/DL
LEUKOCYTE ESTERASE, URINE: ABNORMAL
NITRITE, URINE: POSITIVE
PH UA: 6.5 (ref 5–9)
PROTEIN UA: ABNORMAL MG/DL
RBC UA: ABNORMAL /HPF (ref 0–5)
SPECIFIC GRAVITY UA: 1.02 (ref 1–1.03)
UROBILINOGEN, URINE: 1 E.U./DL
WBC UA: ABNORMAL /HPF (ref 0–5)

## 2022-11-11 NOTE — PROGRESS NOTES
[facility]  10/13/2022    Sarthak Dunn  is a 80 y.o. in the [place] being seen for a acute visit for   Chief Complaint   Patient presents with    Leg Swelling       HPI Patient being seen today for reports of leg swelling. Nursing staff report patient has bilateral leg swelling. Nursing staff report that the patient tends to keep her legs in a dependent position. They are eating and drinking at their baseline per nursing. They are not complaining of any un addressed pain issues. Have had no recent choking or dysphagia issues. NO increase in behaviors. No past medical history on file. No past surgical history on file. No family history on file. Social History     Socioeconomic History    Marital status:      Spouse name: Not on file    Number of children: Not on file    Years of education: Not on file    Highest education level: Not on file   Occupational History    Not on file   Tobacco Use    Smoking status: Former    Smokeless tobacco: Former   Substance and Sexual Activity    Alcohol use: Not Currently    Drug use: Not on file    Sexual activity: Not on file   Other Topics Concern    Not on file   Social History Narrative    Not on file     Social Determinants of Health     Financial Resource Strain: Not on file   Food Insecurity: Not on file   Transportation Needs: Not on file   Physical Activity: Inactive    Days of Exercise per Week: 0 days    Minutes of Exercise per Session: 0 min   Stress: Not on file   Social Connections: Not on file   Intimate Partner Violence: Not on file   Housing Stability: Not on file       Allergies: Patient has no known allergies. NF MEDICATIONS REVIEWED    ROS:   Constitutional: There are no reports of increased behavioral issues, change in appetite, fever, or increased weakness. No bleeding issues. Respiratory: denies SOB, dyspnea, dyspnea on exertion  Cardiovascular: denies CP, lightheadedness, palpitations, PND, orthopnea, or claudication.   GI: No N/V/D. : no reports of dysuria, frequency or urgency  Extremities: No reports of pain issues, edema BLE  Psych: at baseline confusion     Physical exam:   /70, temperature 97.8, heart rate 72, respirations 18, spo2 96%, weight 190lbs. Constitutional: Awake and alert sitting up in recliner, general weakness  HEENT: Normocephalic, atraumatic,intact facial symmetry, conjunctiva pink, sclera non icteric,  buccal mucosa pink and moist  Speech clear. NECK: - no cervical or clavicular lymphadenopathy, euthyroid, no mass visualized  Cardiovascular: Regular rate, and rhythm. No murmurs, gallops or rubs noted. Respiratory: LCTA, even unlabored respirations, no accessory muscle use noted  GI: abdomen NT, +BS x 4 Q, ND  : incontinent of urine  Extremities: bilateral 1+ pitting edema, PPP  SKELETAL: no redness, or swelling over joints. Limited ROM but spontaneous movement x 4   Psych: pleasantly confused, repetitive , needs re-directed, good judgement, normal thought content  SKIN: no gross skin lesions noted on visualized skin, warm and dry    ASSESSMENT:     Diagnosis Orders   1. Edema of both lower extremities            PLAN:  Pt/POA agrees with POC   Tubigrips BLE  Encourage patient to keep legs elevated. Return in about 1 week (around 10/20/2022), or if symptoms worsen or fail to improve. Please note this report is partially produced by using speech recognition hardware. It may contain errors related to the system, including grammar, punctuation and spelling as well as words and phrases that may seem inaccurate.   For any questions or concerns feel free to contact me for clarification       RHONDA Mccarthy - CNP

## 2022-11-12 LAB
ANION GAP SERPL CALCULATED.3IONS-SCNC: 9 MEQ/L (ref 9–15)
BASOPHILS ABSOLUTE: 0 K/UL (ref 0–0.2)
BASOPHILS RELATIVE PERCENT: 0.6 %
BUN BLDV-MCNC: 16 MG/DL (ref 8–23)
CALCIUM SERPL-MCNC: 9 MG/DL (ref 8.5–9.9)
CHLORIDE BLD-SCNC: 108 MEQ/L (ref 95–107)
CO2: 24 MEQ/L (ref 20–31)
CREAT SERPL-MCNC: 0.84 MG/DL (ref 0.5–0.9)
EOSINOPHILS ABSOLUTE: 0.1 K/UL (ref 0–0.7)
EOSINOPHILS RELATIVE PERCENT: 3.1 %
GFR SERPL CREATININE-BSD FRML MDRD: >60 ML/MIN/{1.73_M2}
GLUCOSE BLD-MCNC: 75 MG/DL (ref 70–99)
HCT VFR BLD CALC: 36.8 % (ref 37–47)
HEMOGLOBIN: 11.9 G/DL (ref 12–16)
LYMPHOCYTES ABSOLUTE: 0.7 K/UL (ref 1–4.8)
LYMPHOCYTES RELATIVE PERCENT: 15 %
MCH RBC QN AUTO: 29.7 PG (ref 27–31.3)
MCHC RBC AUTO-ENTMCNC: 32.3 % (ref 33–37)
MCV RBC AUTO: 92.1 FL (ref 79.4–94.8)
MONOCYTES ABSOLUTE: 0.5 K/UL (ref 0.2–0.8)
MONOCYTES RELATIVE PERCENT: 10.6 %
NEUTROPHILS ABSOLUTE: 3.2 K/UL (ref 1.4–6.5)
NEUTROPHILS RELATIVE PERCENT: 70.7 %
PDW BLD-RTO: 15.7 % (ref 11.5–14.5)
PLATELET # BLD: 153 K/UL (ref 130–400)
POTASSIUM SERPL-SCNC: 4.1 MEQ/L (ref 3.4–4.9)
RBC # BLD: 3.99 M/UL (ref 4.2–5.4)
SODIUM BLD-SCNC: 141 MEQ/L (ref 135–144)
WBC # BLD: 4.6 K/UL (ref 4.8–10.8)

## 2022-11-13 LAB
ORGANISM: ABNORMAL
URINE CULTURE, ROUTINE: ABNORMAL
URINE CULTURE, ROUTINE: ABNORMAL

## 2022-11-14 ENCOUNTER — OFFICE VISIT (OUTPATIENT)
Dept: GERIATRIC MEDICINE | Age: 82
End: 2022-11-14
Payer: MEDICARE

## 2022-11-14 DIAGNOSIS — R27.0 ATAXIA: Primary | ICD-10-CM

## 2022-11-14 DIAGNOSIS — I10 ESSENTIAL HYPERTENSION: ICD-10-CM

## 2022-11-14 DIAGNOSIS — E11.9 CONTROLLED TYPE 2 DIABETES MELLITUS WITHOUT COMPLICATION, WITHOUT LONG-TERM CURRENT USE OF INSULIN (HCC): ICD-10-CM

## 2022-11-14 PROCEDURE — 1123F ACP DISCUSS/DSCN MKR DOCD: CPT | Performed by: INTERNAL MEDICINE

## 2022-11-14 PROCEDURE — 99309 SBSQ NF CARE MODERATE MDM 30: CPT | Performed by: INTERNAL MEDICINE

## 2022-11-17 LAB
ALBUMIN SERPL-MCNC: 3.5 G/DL (ref 3.5–4.6)
ALP BLD-CCNC: 42 U/L (ref 40–130)
ALT SERPL-CCNC: 14 U/L (ref 0–33)
AST SERPL-CCNC: 30 U/L (ref 0–35)
BILIRUB SERPL-MCNC: 0.5 MG/DL (ref 0.2–0.7)
BILIRUBIN DIRECT: <0.2 MG/DL (ref 0–0.4)
BILIRUBIN, INDIRECT: NORMAL MG/DL (ref 0–0.6)
TOTAL PROTEIN: 7.3 G/DL (ref 6.3–8)

## 2022-11-21 ENCOUNTER — OFFICE VISIT (OUTPATIENT)
Dept: GERIATRIC MEDICINE | Age: 82
End: 2022-11-21
Payer: MEDICARE

## 2022-11-21 DIAGNOSIS — F01.B3 MODERATE VASCULAR DEMENTIA WITH MOOD DISTURBANCE: ICD-10-CM

## 2022-11-21 DIAGNOSIS — I10 ESSENTIAL HYPERTENSION: Primary | ICD-10-CM

## 2022-11-21 PROCEDURE — 99309 SBSQ NF CARE MODERATE MDM 30: CPT | Performed by: INTERNAL MEDICINE

## 2022-11-21 PROCEDURE — 1123F ACP DISCUSS/DSCN MKR DOCD: CPT | Performed by: INTERNAL MEDICINE

## 2022-11-27 NOTE — PROGRESS NOTES
[facility]  10/27/2022    Enio Jay  is a [de-identified] y.o. in the [place] being seen for a f/u of   Chief Complaint   Patient presents with    Loss of Consciousness       HPI   Being seen today for syncope and collapse. No past medical history on file. No past surgical history on file. No family history on file. Social History     Socioeconomic History    Marital status:      Spouse name: Not on file    Number of children: Not on file    Years of education: Not on file    Highest education level: Not on file   Occupational History    Not on file   Tobacco Use    Smoking status: Former    Smokeless tobacco: Former   Substance and Sexual Activity    Alcohol use: Not Currently    Drug use: Not on file    Sexual activity: Not on file   Other Topics Concern    Not on file   Social History Narrative    Not on file     Social Determinants of Health     Financial Resource Strain: Not on file   Food Insecurity: Not on file   Transportation Needs: Not on file   Physical Activity: Inactive    Days of Exercise per Week: 0 days    Minutes of Exercise per Session: 0 min   Stress: Not on file   Social Connections: Not on file   Intimate Partner Violence: Not on file   Housing Stability: Not on file       Allergies: Patient has no known allergies. NF MEDICATIONS REVIEWED    ROS:  See HPI  Constitutional: There are no reports of behavioral issues, change in appetite, fever, or weakness. No gross bleeding issues. Respiratory: denies SOB, dyspnea, dyspnea on exertion,   Cardiovascular: denies CP, lightheadedness,   GI: No reports of change of bowel habits, no N/V/D/C. : no reports of change in bladder habits  Extremities: No reports of pain issues, no edema    Physical exam:   Blood pressure 117/73, temp 97.8, heart rate 99, respirations 18, pulse ox 97%, weight 190 pounds. Constitutional: Sleeping but arousable elderly female in no apparent distress. HEENT: normocephalic, [place], MMM, no cyanosis.  NO neck mass visualized   Cardiovascular: Regular rate  Respiratory: unlabored respirations, no accessory muscle use noted  GI: abdomen ND  : no bladder tenderness  Extremities: no edema,PPP  Psych: pleasant and able to follow simple commands, normal thought content, speech clear    ASSESSMENT:     Diagnosis Orders   1. Syncope and collapse            PLAN:   Agrees with POC   Patient has not had any syncopal events since her admission to this facility. We will continue to monitor closely. Return in about 1 week (around 11/3/2022), or if symptoms worsen or fail to improve. Pertinent POC, labs, have been reviewed, continue same. Encourage fluids and good nutrition. Stress fall prevention strategies. Nursing to notify Pt/POA for agreement to POC         ________________________    Ector Gilmore. Pratibha Martinez [facility], [address]  Office [phone]  Fax [phone]      Please note this report is partially produced by using speech recognition hardware. It may contain errors related to the system, including grammar, punctuation and spelling as well as words and phrases that may seem inaccurate.   For any questions or concerns feel free to contact me for clarification

## 2022-12-01 NOTE — PROGRESS NOTES
[facility]  11/2/2022    Brie Escudero  is a [de-identified] y.o. in the [place] being seen for a acute visit for   Chief Complaint   Patient presents with    Fall     10/31/22       HPI patient being seen today for acute visit for fall that occurred on 10/31/2022 without injury. They are eating and drinking at their baseline per nursing. They have had no recent falls with injuries. They are not complaining of any un addressed pain issues. Have had no recent choking or dysphagia issues. NO agitation or unusual mental behavioral issues. No past medical history on file. No past surgical history on file. No family history on file. Social History     Socioeconomic History    Marital status:      Spouse name: Not on file    Number of children: Not on file    Years of education: Not on file    Highest education level: Not on file   Occupational History    Not on file   Tobacco Use    Smoking status: Former    Smokeless tobacco: Former   Substance and Sexual Activity    Alcohol use: Not Currently    Drug use: Not on file    Sexual activity: Not on file   Other Topics Concern    Not on file   Social History Narrative    Not on file     Social Determinants of Health     Financial Resource Strain: Not on file   Food Insecurity: Not on file   Transportation Needs: Not on file   Physical Activity: Inactive    Days of Exercise per Week: 0 days    Minutes of Exercise per Session: 0 min   Stress: Not on file   Social Connections: Not on file   Intimate Partner Violence: Not on file   Housing Stability: Not on file       Allergies: Patient has no known allergies. NF MEDICATIONS REVIEWED    ROS:   Constitutional: There are no reports of behavioral issues, change in appetite, fever, or increased weakness. No bleeding issues. Respiratory: denies SOB, dyspnea, dyspnea on exertion  Cardiovascular: denies CP, lightheadedness, palpitations, PND, orthopnea, or claudication. GI: No N/V/D.    : no reports of dysuria, frequency or urgency  Extremities: No reports of pain issues, edema  Psych: at baseline confusion     Physical exam:   Blood pressure 132/74, temp 97.8, heart rate 76, respirations 18, pulse ox 96% room air, weight 190. Constitutional: Awake and alert sitting up in recliner, confused, general weakness  HEENT: Normocephalic, hard of hearing,conjunctiva pink, sclera non icteric,  buccal mucosa pink and moist  Speech clear. NECK: - no cervical or clavicular lymphadenopathy, euthyroid, no mass visualized  Cardiovascular: Regular rate, and rhythm. No murmurs, gallops or rubs noted. Respiratory: LCTA, even unlabored respirations, no accessory muscle use noted  GI: abdomen NT, +BS x 4 Q, ND  : No suprapubic tenderness  Extremities: 2+ pitting bipedal edema, PPP  SKELETAL: no redness, or swelling over joints. Limited ROM but spontaneous movement x 4   Psych: pleasantly confused, occasionally requires redirection ,good judgement, normal thought content  SKIN: no gross skin lesions noted on visualized skin, warm and dry    ASSESSMENT:     Diagnosis Orders   1. Fall, initial encounter            PLAN:  Pt/POA agrees with POC   Patient denies injury as a result of her fall. I have advised the resident once again to keep her legs elevated when she is in a seated position as she has 2+ pitting bipedal edema. She will require frequent reeducation from the nurses. I have discussed this with nursing staff. Return in about 1 month (around 12/2/2022), or if symptoms worsen or fail to improve, for chronic conditions. Please note this report is partially produced by using speech recognition hardware. It may contain errors related to the system, including grammar, punctuation and spelling as well as words and phrases that may seem inaccurate.   For any questions or concerns feel free to contact me for clarification       Alannah Lopez, RHONDA - CNP

## 2022-12-03 LAB
ANION GAP SERPL CALCULATED.3IONS-SCNC: 12 MEQ/L (ref 9–15)
BUN BLDV-MCNC: 18 MG/DL (ref 8–23)
CALCIUM SERPL-MCNC: 9 MG/DL (ref 8.5–9.9)
CHLORIDE BLD-SCNC: 105 MEQ/L (ref 95–107)
CO2: 22 MEQ/L (ref 20–31)
CREAT SERPL-MCNC: 0.85 MG/DL (ref 0.5–0.9)
GFR SERPL CREATININE-BSD FRML MDRD: >60 ML/MIN/{1.73_M2}
GLUCOSE BLD-MCNC: 89 MG/DL (ref 70–99)
HCT VFR BLD CALC: 26.9 % (ref 37–47)
HEMOGLOBIN: 8.7 G/DL (ref 12–16)
MCH RBC QN AUTO: 30.9 PG (ref 27–31.3)
MCHC RBC AUTO-ENTMCNC: 32.1 % (ref 33–37)
MCV RBC AUTO: 96.3 FL (ref 79.4–94.8)
PDW BLD-RTO: 18.5 % (ref 11.5–14.5)
PLATELET # BLD: 384 K/UL (ref 130–400)
POTASSIUM SERPL-SCNC: 3.7 MEQ/L (ref 3.4–4.9)
RBC # BLD: 2.8 M/UL (ref 4.2–5.4)
SODIUM BLD-SCNC: 139 MEQ/L (ref 135–144)
WBC # BLD: 6.8 K/UL (ref 4.8–10.8)

## 2022-12-05 ENCOUNTER — OFFICE VISIT (OUTPATIENT)
Dept: GERIATRIC MEDICINE | Age: 82
End: 2022-12-05

## 2022-12-05 DIAGNOSIS — I10 ESSENTIAL HYPERTENSION: Primary | ICD-10-CM

## 2022-12-05 DIAGNOSIS — F01.B3 MODERATE VASCULAR DEMENTIA WITH MOOD DISTURBANCE: ICD-10-CM

## 2022-12-05 DIAGNOSIS — I48.91 ATRIAL FIBRILLATION, UNSPECIFIED TYPE (HCC): ICD-10-CM

## 2022-12-05 LAB — C DIFF TOXIN/ANTIGEN: NORMAL

## 2022-12-06 LAB
HCT VFR BLD CALC: 27.9 % (ref 37–47)
RETICULOCYTE ABSOLUTE COUNT: 0.07 M/CUMM (ref 0.02–0.11)
RETICULOCYTE COUNT PCT: 2.3 % (ref 0.6–2.2)

## 2022-12-07 PROBLEM — E55.9 VITAMIN D DEFICIENCY: Status: ACTIVE | Noted: 2022-12-07

## 2022-12-07 LAB
FERRITIN: 292 NG/ML (ref 13–150)
HCT VFR BLD CALC: 27.3 % (ref 37–47)
IRON SATURATION: 18 % (ref 20–55)
IRON: 54 UG/DL (ref 37–145)
RETICULOCYTE ABSOLUTE COUNT: 0.06 M/CUMM (ref 0.02–0.11)
RETICULOCYTE COUNT PCT: 2.1 % (ref 0.6–2.2)
TOTAL IRON BINDING CAPACITY: 298 UG/DL (ref 250–450)
UNSATURATED IRON BINDING CAPACITY: 244 UG/DL (ref 112–347)
VITAMIN B-12: 1162 PG/ML (ref 232–1245)

## 2022-12-07 NOTE — PROGRESS NOTES
[facility]  11/7/2022    Zayda Rhodes  is a [de-identified] y.o. in the [place] being seen for a f/u of   Chief Complaint   Patient presents with    Other     Vitamin D deficiency       HPI   Being seen today for vitamin D deficiency. No past medical history on file. No past surgical history on file. No family history on file. Social History     Socioeconomic History    Marital status:      Spouse name: Not on file    Number of children: Not on file    Years of education: Not on file    Highest education level: Not on file   Occupational History    Not on file   Tobacco Use    Smoking status: Former    Smokeless tobacco: Former   Substance and Sexual Activity    Alcohol use: Not Currently    Drug use: Not on file    Sexual activity: Not on file   Other Topics Concern    Not on file   Social History Narrative    Not on file     Social Determinants of Health     Financial Resource Strain: Not on file   Food Insecurity: Not on file   Transportation Needs: Not on file   Physical Activity: Inactive    Days of Exercise per Week: 0 days    Minutes of Exercise per Session: 0 min   Stress: Not on file   Social Connections: Not on file   Intimate Partner Violence: Not on file   Housing Stability: Not on file       Allergies: Patient has no known allergies. NF MEDICATIONS REVIEWED    ROS:  See HPI  Constitutional: There are no reports of behavioral issues, change in appetite, fever, or weakness. No gross bleeding issues. Respiratory: denies SOB, dyspnea, dyspnea on exertion,   Cardiovascular: denies CP, lightheadedness,   GI: No reports of change of bowel habits, no N/V/D/C. : no reports of change in bladder habits  Extremities: No reports of pain issues, no edema    Physical exam:  Blood pressure 122/68, temp 97.8, heart rate 70, respirations 18, pulse ox 97% room air, weight 192 pounds. Constitutional: Alert, elderly female, pleasantly confused in no apparent distress.   HEENT: normocephalic,[place], MMM, no cyanosis. NO neck mass visualized   Cardiovascular: Regular rate  Respiratory: unlabored respirations, no accessory muscle use noted  GI: abdomen ND  : no bladder tenderness  Extremities: nonpitting edema,PPP  Psych: pleasant and able to follow simple commands, normal thought content, speech clear    ASSESSMENT:     Diagnosis Orders   1. Vitamin D deficiency            PLAN:   Agrees with POC   Change vitamin D to 2000 IU daily  Most recent vitamin D level 34.8. Return in about 1 week (around 11/14/2022), or if symptoms worsen or fail to improve. Pertinent POC, labs, have been reviewed, continue same. Encourage fluids and good nutrition. Stress fall prevention strategies. Nursing to notify Pt/POA for agreement to POC         ________________________    Yue Armas. Ilda Saleh [facility], [address]  Office [phone]  Fax [phone]      Please note this report is partially produced by using speech recognition hardware. It may contain errors related to the system, including grammar, punctuation and spelling as well as words and phrases that may seem inaccurate.   For any questions or concerns feel free to contact me for clarification

## 2022-12-13 LAB
BASOPHILS ABSOLUTE: 0 K/UL (ref 0–0.2)
BASOPHILS RELATIVE PERCENT: 0.4 %
EOSINOPHILS ABSOLUTE: 0.1 K/UL (ref 0–0.7)
EOSINOPHILS RELATIVE PERCENT: 1.8 %
HCT VFR BLD CALC: 28.2 % (ref 37–47)
HEMOGLOBIN: 9.3 G/DL (ref 12–16)
LYMPHOCYTES ABSOLUTE: 0.5 K/UL (ref 1–4.8)
LYMPHOCYTES RELATIVE PERCENT: 13 %
MCH RBC QN AUTO: 31.8 PG (ref 27–31.3)
MCHC RBC AUTO-ENTMCNC: 33.1 % (ref 33–37)
MCV RBC AUTO: 96.3 FL (ref 79.4–94.8)
MONOCYTES ABSOLUTE: 0.4 K/UL (ref 0.2–0.8)
MONOCYTES RELATIVE PERCENT: 9 %
NEUTROPHILS ABSOLUTE: 3.1 K/UL (ref 1.4–6.5)
NEUTROPHILS RELATIVE PERCENT: 75.8 %
PDW BLD-RTO: 18.1 % (ref 11.5–14.5)
PLATELET # BLD: 201 K/UL (ref 130–400)
RBC # BLD: 2.93 M/UL (ref 4.2–5.4)
WBC # BLD: 4.1 K/UL (ref 4.8–10.8)

## 2022-12-13 NOTE — PROGRESS NOTES
SUBJECTIVE:  65-year-old woman seen in follow-up visit for cognitive hemorrhage status with balance issues hypertension. Patient has a history of diabetes without symptomatic hypoglycemia at this time. Patient is functionally intact without evidence of new agitation continued redirection at this time. Coughing at times. Has had recent COVID-19. ROS: Denies chest palpitation has been coughing nonproductive sputum  The rest of the 14 point ROS negative    PHYSICAL EXAM: VSS per facility record  . Pupils are reactive oral mucosa is dry chest no crackles or wheezing cardiovascular showed a regular rate abdomen soft tender extremity trace edema skin shows no rash    ASSESSMENT & PLAN:   Diagnosis Orders   1. Ataxia        2. Essential hypertension        3. Controlled type 2 diabetes mellitus without complication, without long-term current use of insulin ([address]            Valve training. Monitoring systolic pressure of  Done on my Cardizem beta-blocker function stable. Patient is on memantine has been on quetiapine like to wean as able is on donepezil function stable this time. No past medical history on file. No past surgical history on file. Current Outpatient Medications on File Prior to Visit   Medication Sig Dispense Refill    acetaminophen (TYLENOL) 325 MG tablet Take by mouth      insulin lispro, 1 Unit Dial, 100 UNIT/ML SOPN Inject 0-10 Units into the skin 3 times daily Indications: Type 2 Diabetes Inject as per sliding scale: -623=6m; 201-250=4u; 251-300=6u; 301-350=8u; 351-400=10u. Notify MD/NP if BS <60 or >400. Lactobacillus (ACIDOPHILUS) CAPS capsule Take 1 capsule by mouth in the morning. Indications: Nutritional Support. escitalopram (LEXAPRO) 10 MG tablet Take 10 mg by mouth in the morning. Indications: Depression. furosemide (LASIX) 20 MG tablet Take 20 mg by mouth in the morning. Indications: High Blood Pressure Disorder.       melatonin 3 MG TABS tablet Take 3 mg by mouth nightly Indications: Trouble Sleeping      memantine (NAMENDA) 10 MG tablet Take 10 mg by mouth in the morning and 10 mg before bedtime. Indications: Decline in Cognition due to a Brain Disease. QUEtiapine (SEROQUEL) 25 MG tablet Take 12.5 mg by mouth in the morning. Indications: Behavioral Disorders associated with Dementia. telmisartan-hydroCHLOROthiazide (MICARDIS HCT) 80-12.5 MG per tablet Take 1 tablet by mouth in the morning. Indications: High Blood Pressure Disorder. ondansetron (ZOFRAN-ODT) 4 MG disintegrating tablet Take 4 mg by mouth every 6 hours as needed for Nausea or Vomiting      carvedilol (COREG) 25 MG tablet Take 25 mg by mouth 2 times daily (with meals) Indications: High Blood Pressure Disorder      donepezil (ARICEPT) 10 MG tablet Take 10 mg by mouth nightly Indications: Decline in Cognition due to a Brain Disease      SITagliptin (JANUVIA) 100 MG tablet Take 100 mg by mouth daily Indications: Type 2 Diabetes      montelukast (SINGULAIR) 5 MG chewable tablet Take 5 mg by mouth nightly Indications: Asthma      cyanocobalamin 1000 MCG tablet Take 1,000 mcg by mouth daily Indications: Nutritional Support       No current facility-administered medications on file prior to visit. No family history on file.     Social History     Socioeconomic History    Marital status:      Spouse name: Not on file    Number of children: Not on file    Years of education: Not on file    Highest education level: Not on file   Occupational History    Not on file   Tobacco Use    Smoking status: Former    Smokeless tobacco: Former   Substance and Sexual Activity    Alcohol use: Not Currently    Drug use: Not on file    Sexual activity: Not on file   Other Topics Concern    Not on file   Social History Narrative    Not on file     Social Determinants of Health     Financial Resource Strain: Not on file   Food Insecurity: Not on file   Transportation Needs: Not on file Physical Activity: Inactive    Days of Exercise per Week: 0 days    Minutes of Exercise per Session: 0 min   Stress: Not on file   Social Connections: Not on file   Intimate Partner Violence: Not on file   Housing Stability: Not on file         Lab Results   Component Value Date    LABA1C 4.4 (L) 08/12/2022     No results found for: EAG    Lab Results   Component Value Date/Time     12/03/2022 09:40 AM    K 3.7 12/03/2022 09:40 AM     12/03/2022 09:40 AM    CO2 22 12/03/2022 09:40 AM    BUN 18 12/03/2022 09:40 AM    CREATININE 0.85 12/03/2022 09:40 AM    GLUCOSE 89 12/03/2022 09:40 AM    GLUCOSE 76 09/18/2022 07:00 AM    CALCIUM 9.0 12/03/2022 09:40 AM        Lab Results   Component Value Date    CHOL 106 08/12/2022     Lab Results   Component Value Date    TRIG 97 08/12/2022     Lab Results   Component Value Date    HDL 17 (L) 08/12/2022     Lab Results   Component Value Date    LDLCALC 70 08/12/2022     No results found for: LABVLDL, VLDL  No results found for: [place]    Lab Results   Component Value Date    TSH 2.260 08/12/2022       Lab Results   Component Value Date    WBC 4.1 (L) 12/13/2022    HGB 9.3 (L) 12/13/2022    HCT 28.2 (L) 12/13/2022    MCV 96.3 (H) 12/13/2022     12/13/2022       Please note orders entered on site at facility after discussion with appropriate facility nursing/therapy/ / nutritional staff. Current longstanding medical problems and acute medical issues addressed with staff. Available data and data elements in on site paper chart reviewed and analyzed. Current external consultant notes reviewed in on site chart. Ordered laboratory testing and imaging will be reviewed when available.

## 2022-12-14 LAB
BASOPHILS ABSOLUTE: 0 K/UL (ref 0–0.2)
BASOPHILS RELATIVE PERCENT: 0.5 %
EOSINOPHILS ABSOLUTE: 0.1 K/UL (ref 0–0.7)
EOSINOPHILS RELATIVE PERCENT: 1.5 %
HCT VFR BLD CALC: 31.8 % (ref 37–47)
HEMOGLOBIN: 9.8 G/DL (ref 12–16)
LYMPHOCYTES ABSOLUTE: 0.9 K/UL (ref 1–4.8)
LYMPHOCYTES RELATIVE PERCENT: 19.9 %
MCH RBC QN AUTO: 31 PG (ref 27–31.3)
MCHC RBC AUTO-ENTMCNC: 30.8 % (ref 33–37)
MCV RBC AUTO: 100.7 FL (ref 79.4–94.8)
MONOCYTES ABSOLUTE: 0.3 K/UL (ref 0.2–0.8)
MONOCYTES RELATIVE PERCENT: 7.9 %
NEUTROPHILS ABSOLUTE: 3 K/UL (ref 1.4–6.5)
NEUTROPHILS RELATIVE PERCENT: 70.2 %
PDW BLD-RTO: 18.6 % (ref 11.5–14.5)
PLATELET # BLD: 174 K/UL (ref 130–400)
RBC # BLD: 3.15 M/UL (ref 4.2–5.4)
WBC # BLD: 4.3 K/UL (ref 4.8–10.8)

## 2022-12-16 ENCOUNTER — OFFICE VISIT (OUTPATIENT)
Dept: GERIATRIC MEDICINE | Age: 82
End: 2022-12-16
Payer: MEDICARE

## 2022-12-16 DIAGNOSIS — E11.9 CONTROLLED TYPE 2 DIABETES MELLITUS WITHOUT COMPLICATION, WITHOUT LONG-TERM CURRENT USE OF INSULIN (HCC): ICD-10-CM

## 2022-12-16 DIAGNOSIS — I48.91 ATRIAL FIBRILLATION, UNSPECIFIED TYPE (HCC): ICD-10-CM

## 2022-12-16 DIAGNOSIS — U07.1 COVID: Primary | ICD-10-CM

## 2022-12-16 DIAGNOSIS — F01.B11 MODERATE VASCULAR DEMENTIA WITH AGITATION: ICD-10-CM

## 2022-12-16 DIAGNOSIS — L03.115 CELLULITIS OF RIGHT LOWER EXTREMITY: ICD-10-CM

## 2022-12-16 PROCEDURE — 99310 SBSQ NF CARE HIGH MDM 45: CPT | Performed by: NURSE PRACTITIONER

## 2022-12-16 PROCEDURE — 1123F ACP DISCUSS/DSCN MKR DOCD: CPT | Performed by: NURSE PRACTITIONER

## 2022-12-17 LAB
ANION GAP SERPL CALCULATED.3IONS-SCNC: 9 MEQ/L (ref 9–15)
ATYPICAL LYMPHOCYTE RELATIVE PERCENT: 1 %
BANDED NEUTROPHILS RELATIVE PERCENT: 1 % (ref 5–11)
BASOPHILS ABSOLUTE: 0 K/UL (ref 0–0.2)
BASOPHILS RELATIVE PERCENT: 0.6 %
BUN BLDV-MCNC: 19 MG/DL (ref 8–23)
CALCIUM SERPL-MCNC: 9 MG/DL (ref 8.5–9.9)
CHLORIDE BLD-SCNC: 105 MEQ/L (ref 95–107)
CO2: 26 MEQ/L (ref 20–31)
CREAT SERPL-MCNC: 0.92 MG/DL (ref 0.5–0.9)
EOSINOPHILS ABSOLUTE: 0 K/UL (ref 0–0.7)
EOSINOPHILS RELATIVE PERCENT: 1 %
GFR SERPL CREATININE-BSD FRML MDRD: >60 ML/MIN/{1.73_M2}
GLUCOSE BLD-MCNC: 77 MG/DL (ref 70–99)
HCT VFR BLD CALC: 31.7 % (ref 37–47)
HEMOGLOBIN: 10.4 G/DL (ref 12–16)
LYMPHOCYTES ABSOLUTE: 0.5 K/UL (ref 1–4.8)
LYMPHOCYTES RELATIVE PERCENT: 13 %
MCH RBC QN AUTO: 32 PG (ref 27–31.3)
MCHC RBC AUTO-ENTMCNC: 32.7 % (ref 33–37)
MCV RBC AUTO: 98 FL (ref 79.4–94.8)
MONOCYTES ABSOLUTE: 0.4 K/UL (ref 0.2–0.8)
MONOCYTES RELATIVE PERCENT: 9.5 %
NEUTROPHILS ABSOLUTE: 2.9 K/UL (ref 1.4–6.5)
NEUTROPHILS RELATIVE PERCENT: 74 %
PDW BLD-RTO: 18.3 % (ref 11.5–14.5)
PLATELET # BLD: 181 K/UL (ref 130–400)
PLATELET SLIDE REVIEW: ABNORMAL
POIKILOCYTES: ABNORMAL
POTASSIUM SERPL-SCNC: 4 MEQ/L (ref 3.4–4.9)
RBC # BLD: 3.24 M/UL (ref 4.2–5.4)
SODIUM BLD-SCNC: 140 MEQ/L (ref 135–144)
VACUOLATED NEUTROPHILS: ABNORMAL
WBC # BLD: 3.9 K/UL (ref 4.8–10.8)

## 2022-12-19 ENCOUNTER — OFFICE VISIT (OUTPATIENT)
Dept: GERIATRIC MEDICINE | Age: 82
End: 2022-12-19

## 2022-12-19 DIAGNOSIS — U07.1 COVID: Primary | ICD-10-CM

## 2022-12-19 DIAGNOSIS — R60.0 EDEMA OF BOTH LOWER EXTREMITIES: ICD-10-CM

## 2022-12-21 NOTE — PROGRESS NOTES
SUBJECTIVE:  14-year-old woman seen for follow-up visit for dementia hypertension generative disease. Patient had recent nausea no recent change in her bowel bladder habits no recent loss conscious patient's oral intake is good no new acute psychosis although has had episodes of behavior in the past      ROS: Limited by cognition  The rest of the 14 point ROS negative    PHYSICAL EXAM: VSS per facility record  Pupils are small oral Koza moist chest no crackles no wheezing cardiovascular showed a regular rate abdomen soft nontender extremity trace edema    ASSESSMENT & PLAN:   Diagnosis Orders   1. Essential hypertension        2. Moderate vascular dementia with mood disturbance            Intermittent monitoring weights continue with reorientation as able notes no acute decompensation for atrial fibrillation. No past medical history on file. No past surgical history on file. Current Outpatient Medications on File Prior to Visit   Medication Sig Dispense Refill    acetaminophen (TYLENOL) 325 MG tablet Take by mouth      insulin lispro, 1 Unit Dial, 100 UNIT/ML SOPN Inject 0-10 Units into the skin 3 times daily Indications: Type 2 Diabetes Inject as per sliding scale: -040=2v; 201-250=4u; 251-300=6u; 301-350=8u; 351-400=10u. Notify MD/NP if BS <60 or >400. Lactobacillus (ACIDOPHILUS) CAPS capsule Take 1 capsule by mouth in the morning. Indications: Nutritional Support. escitalopram (LEXAPRO) 10 MG tablet Take 10 mg by mouth in the morning. Indications: Depression. furosemide (LASIX) 20 MG tablet Take 20 mg by mouth in the morning. Indications: High Blood Pressure Disorder. melatonin 3 MG TABS tablet Take 3 mg by mouth nightly Indications: Trouble Sleeping      memantine (NAMENDA) 10 MG tablet Take 10 mg by mouth in the morning and 10 mg before bedtime. Indications: Decline in Cognition due to a Brain Disease.       QUEtiapine (SEROQUEL) 25 MG tablet Take 12.5 mg by mouth in the morning. Indications: Behavioral Disorders associated with Dementia. telmisartan-hydroCHLOROthiazide (MICARDIS HCT) 80-12.5 MG per tablet Take 1 tablet by mouth in the morning. Indications: High Blood Pressure Disorder. ondansetron (ZOFRAN-ODT) 4 MG disintegrating tablet Take 4 mg by mouth every 6 hours as needed for Nausea or Vomiting      carvedilol (COREG) 25 MG tablet Take 25 mg by mouth 2 times daily (with meals) Indications: High Blood Pressure Disorder      donepezil (ARICEPT) 10 MG tablet Take 10 mg by mouth nightly Indications: Decline in Cognition due to a Brain Disease      SITagliptin (JANUVIA) 100 MG tablet Take 100 mg by mouth daily Indications: Type 2 Diabetes      montelukast (SINGULAIR) 5 MG chewable tablet Take 5 mg by mouth nightly Indications: Asthma      cyanocobalamin 1000 MCG tablet Take 1,000 mcg by mouth daily Indications: Nutritional Support       No current facility-administered medications on file prior to visit. No family history on file.     Social History     Socioeconomic History    Marital status:      Spouse name: Not on file    Number of children: Not on file    Years of education: Not on file    Highest education level: Not on file   Occupational History    Not on file   Tobacco Use    Smoking status: Former    Smokeless tobacco: Former   Substance and Sexual Activity    Alcohol use: Not Currently    Drug use: Not on file    Sexual activity: Not on file   Other Topics Concern    Not on file   Social History Narrative    Not on file     Social Determinants of Health     Financial Resource Strain: Not on file   Food Insecurity: Not on file   Transportation Needs: Not on file   Physical Activity: Inactive    Days of Exercise per Week: 0 days    Minutes of Exercise per Session: 0 min   Stress: Not on file   Social Connections: Not on file   Intimate Partner Violence: Not on file   Housing Stability: Not on file         Lab Results   Component Value Date LABA1C 4.4 (L) 08/12/2022     No results found for: EAG    Lab Results   Component Value Date/Time     12/17/2022 08:41 AM    K 4.0 12/17/2022 08:41 AM     12/17/2022 08:41 AM    CO2 26 12/17/2022 08:41 AM    BUN 19 12/17/2022 08:41 AM    CREATININE 0.92 12/17/2022 08:41 AM    GLUCOSE 77 12/17/2022 08:41 AM    GLUCOSE 76 09/18/2022 07:00 AM    CALCIUM 9.0 12/17/2022 08:41 AM        Lab Results   Component Value Date    CHOL 106 08/12/2022     Lab Results   Component Value Date    TRIG 97 08/12/2022     Lab Results   Component Value Date    HDL 17 (L) 08/12/2022     Lab Results   Component Value Date    LDLCALC 70 08/12/2022     No results found for: LABVLDL, VLDL  No results found for: [facility]    Lab Results   Component Value Date    TSH 2.260 08/12/2022       Lab Results   Component Value Date    WBC 3.9 (L) 12/17/2022    HGB 10.4 (L) 12/17/2022    HCT 31.7 (L) 12/17/2022    MCV 98.0 (H) 12/17/2022     12/17/2022       Please note orders entered on site at facility after discussion with appropriate facility nursing/therapy/ / nutritional staff. Current longstanding medical problems and acute medical issues addressed with staff. Available data and data elements in on site paper chart reviewed and analyzed. Current external consultant notes reviewed in on site chart. Ordered laboratory testing and imaging will be reviewed when available.

## 2022-12-28 NOTE — PROGRESS NOTES
[facility]  12/5/2022    Mario Gallo  is a [de-identified] y.o. in the [facility] being seen for a f/u of   Chief Complaint   Patient presents with    1 Month Follow-Up       HPI lives in [address] [place] [address]  Being seen monthly for chronic illnesses. No past medical history on file. No past surgical history on file. No family history on file. Social History     Socioeconomic History    Marital status:      Spouse name: Not on file    Number of children: Not on file    Years of education: Not on file    Highest education level: Not on file   Occupational History    Not on file   Tobacco Use    Smoking status: Former    Smokeless tobacco: Former   Substance and Sexual Activity    Alcohol use: Not Currently    Drug use: Not on file    Sexual activity: Not on file   Other Topics Concern    Not on file   Social History Narrative    Not on file     Social Determinants of Health     Financial Resource Strain: Not on file   Food Insecurity: Not on file   Transportation Needs: Not on file   Physical Activity: Inactive    Days of Exercise per Week: 0 days    Minutes of Exercise per Session: 0 min   Stress: Not on file   Social Connections: Not on file   Intimate Partner Violence: Not on file   Housing Stability: Not on file       Allergies: Patient has no known allergies. NF MEDICATIONS REVIEWED    ROS:  See HPI  Constitutional: There are no reports of behavioral issues, change in appetite, fever, or weakness. No gross bleeding issues. Respiratory: denies SOB, dyspnea, dyspnea on exertion,   Cardiovascular: denies CP, lightheadedness,   GI: No reports of change of bowel habits, no N/V/D/C. : no reports of change in bladder habits  Extremities: No reports of pain issues, chronic nonpitting edema    Physical exam:  Blood pressure 110/85, temp 97.6, heart rate 76, respirations 18, pulse ox 96% room air,weight 197 pounds. Constitutional: Alert, elderly female.   Sitting on her bed at the time my visit in no apparent distress. HEENT: normocephalic, [place], MMM, no cyanosis. NO neck mass visualized   Cardiovascular: Regular rate  Respiratory: unlabored respirations, no accessory muscle use noted  GI: abdomen ND  : no bladder tenderness  Extremities: nonpitting edema BLE, PPP  Psych: pleasant and able to follow simple commands, normal thought content, speech clear    ASSESSMENT:     Diagnosis Orders   1. Essential hypertension        2. Moderate vascular dementia with mood disturbance        3. Atrial fibrillation, unspecified type ([address]            PLAN:   Agrees with POC   Blood pressure stable. Patient has not had any hypertensive or hypotensive events. Mood stable. Patient resides on the dementia unit of this facility where she derives benefit. She has not had any further decrease in her cognition or increase in her behaviors. Patient is currently in a regular rhythm. She denies any chest pain, chest discomfort, or heart palpitations that she is aware of. Return in about 1 month (around 1/5/2023), or if symptoms worsen or fail to improve, for chronic conditions. Pertinent POC, labs, have been reviewed, continue same. Encourage fluids and good nutrition. Stress fall prevention strategies. Nursing to notify Pt/POA for agreement to POC         ________________________    Demond Shukla. [facility], [address], [address] [place] [address]  Office [phone]  Fax [phone]      Please note this report is partially produced by using speech recognition hardware. It may contain errors related to the system, including grammar, punctuation and spelling as well as words and phrases that may seem inaccurate.   For any questions or concerns feel free to contact me for clarification
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
## 2023-01-01 ENCOUNTER — PATIENT MESSAGE (OUTPATIENT)
Dept: NEUROLOGY | Age: 83
End: 2023-01-01

## 2023-01-01 ENCOUNTER — OFFICE VISIT (OUTPATIENT)
Dept: GERIATRIC MEDICINE | Age: 83
End: 2023-01-01

## 2023-01-01 ENCOUNTER — PATIENT MESSAGE (OUTPATIENT)
Dept: GERIATRIC MEDICINE | Age: 83
End: 2023-01-01

## 2023-01-01 DIAGNOSIS — N30.00 ACUTE CYSTITIS WITHOUT HEMATURIA: Primary | ICD-10-CM

## 2023-01-01 DIAGNOSIS — G47.33 OSA (OBSTRUCTIVE SLEEP APNEA): ICD-10-CM

## 2023-01-01 DIAGNOSIS — R62.7 FAILURE TO THRIVE IN ADULT: ICD-10-CM

## 2023-01-01 DIAGNOSIS — R53.1 WEAKNESS: ICD-10-CM

## 2023-01-01 DIAGNOSIS — F01.B11 MODERATE VASCULAR DEMENTIA WITH AGITATION (HCC): Primary | ICD-10-CM

## 2023-01-01 DIAGNOSIS — R41.0 CONFUSION: ICD-10-CM

## 2023-01-02 ENCOUNTER — OFFICE VISIT (OUTPATIENT)
Dept: GERIATRIC MEDICINE | Age: 83
End: 2023-01-02
Payer: MEDICARE

## 2023-01-02 DIAGNOSIS — I10 ESSENTIAL HYPERTENSION: ICD-10-CM

## 2023-01-02 DIAGNOSIS — Z79.4 CONTROLLED TYPE 2 DIABETES MELLITUS WITHOUT COMPLICATION, WITH LONG-TERM CURRENT USE OF INSULIN (HCC): ICD-10-CM

## 2023-01-02 DIAGNOSIS — E11.9 CONTROLLED TYPE 2 DIABETES MELLITUS WITHOUT COMPLICATION, WITH LONG-TERM CURRENT USE OF INSULIN (HCC): ICD-10-CM

## 2023-01-02 DIAGNOSIS — F01.B11 MODERATE VASCULAR DEMENTIA WITH AGITATION: Primary | ICD-10-CM

## 2023-01-02 PROCEDURE — 99308 SBSQ NF CARE LOW MDM 20: CPT | Performed by: INTERNAL MEDICINE

## 2023-01-02 PROCEDURE — 1123F ACP DISCUSS/DSCN MKR DOCD: CPT | Performed by: INTERNAL MEDICINE

## 2023-01-08 LAB
ANION GAP SERPL CALCULATED.3IONS-SCNC: 10 MEQ/L (ref 9–15)
BASOPHILS ABSOLUTE: 0 K/UL (ref 0–0.2)
BASOPHILS RELATIVE PERCENT: 0.3 %
BUN BLDV-MCNC: 20 MG/DL (ref 8–23)
CALCIUM SERPL-MCNC: 8.7 MG/DL (ref 8.5–9.9)
CHLORIDE BLD-SCNC: 103 MEQ/L (ref 95–107)
CO2: 29 MEQ/L (ref 20–31)
CREAT SERPL-MCNC: 0.9 MG/DL (ref 0.5–0.9)
EOSINOPHILS ABSOLUTE: 0 K/UL (ref 0–0.7)
EOSINOPHILS RELATIVE PERCENT: 0.1 %
GFR SERPL CREATININE-BSD FRML MDRD: >60 ML/MIN/{1.73_M2}
GLUCOSE BLD-MCNC: 84 MG/DL (ref 70–99)
HCT VFR BLD CALC: 31.7 % (ref 37–47)
HEMOGLOBIN: 10.5 G/DL (ref 12–16)
LYMPHOCYTES ABSOLUTE: 0.9 K/UL (ref 1–4.8)
LYMPHOCYTES RELATIVE PERCENT: 7.2 %
MCH RBC QN AUTO: 30.8 PG (ref 27–31.3)
MCHC RBC AUTO-ENTMCNC: 33 % (ref 33–37)
MCV RBC AUTO: 93.4 FL (ref 79.4–94.8)
MONOCYTES ABSOLUTE: 1.1 K/UL (ref 0.2–0.8)
MONOCYTES RELATIVE PERCENT: 8.5 %
NEUTROPHILS ABSOLUTE: 10.6 K/UL (ref 1.4–6.5)
NEUTROPHILS RELATIVE PERCENT: 83.9 %
PDW BLD-RTO: 15.5 % (ref 11.5–14.5)
PLATELET # BLD: 184 K/UL (ref 130–400)
POTASSIUM SERPL-SCNC: 3.5 MEQ/L (ref 3.4–4.9)
RBC # BLD: 3.4 M/UL (ref 4.2–5.4)
SODIUM BLD-SCNC: 142 MEQ/L (ref 135–144)
WBC # BLD: 12.6 K/UL (ref 4.8–10.8)

## 2023-01-09 LAB
BILIRUBIN, URINE: NEGATIVE
BLOOD, URINE: POSITIVE
CLARITY: ABNORMAL
COLOR: YELLOW
GLUCOSE URINE: NORMAL
KETONES, URINE: NEGATIVE
LEUKOCYTE ESTERASE, URINE: 250
NITRITE, URINE: ABNORMAL
PH UA: 7 (ref 4.5–8)
PROTEIN UA: POSITIVE
SPECIFIC GRAVITY, URINE: 1.02
UROBILINOGEN, URINE: NORMAL

## 2023-01-10 LAB
ANION GAP SERPL CALCULATED.3IONS-SCNC: 12 MEQ/L (ref 9–15)
BUN BLDV-MCNC: 19 MG/DL (ref 8–23)
CALCIUM SERPL-MCNC: 8.7 MG/DL (ref 8.5–9.9)
CHLORIDE BLD-SCNC: 105 MEQ/L (ref 95–107)
CO2: 27 MEQ/L (ref 20–31)
CREAT SERPL-MCNC: 0.86 MG/DL (ref 0.5–0.9)
GFR SERPL CREATININE-BSD FRML MDRD: >60 ML/MIN/{1.73_M2}
GLUCOSE BLD-MCNC: 68 MG/DL (ref 70–99)
POTASSIUM SERPL-SCNC: 4.2 MEQ/L (ref 3.4–4.9)
SODIUM BLD-SCNC: 144 MEQ/L (ref 135–144)

## 2023-01-11 PROBLEM — Z90.710 ACQUIRED ABSENCE OF BOTH CERVIX AND UTERUS: Status: RESOLVED | Noted: 2022-01-28 | Resolved: 2023-01-01

## 2023-01-11 PROBLEM — S00.83XA CONTUSION OF FACE: Status: RESOLVED | Noted: 2022-08-18 | Resolved: 2023-01-11

## 2023-01-11 PROBLEM — L53.9: Status: RESOLVED | Noted: 2020-08-26 | Resolved: 2023-01-01

## 2023-01-11 PROBLEM — N39.0 URINARY TRACT INFECTION, SITE NOT SPECIFIED: Status: RESOLVED | Noted: 2022-01-28 | Resolved: 2023-01-11

## 2023-01-11 PROBLEM — M62.82 NON-TRAUMATIC RHABDOMYOLYSIS: Status: RESOLVED | Noted: 2020-08-26 | Resolved: 2023-01-11

## 2023-01-11 PROBLEM — E87.6 HYPOKALEMIA: Status: RESOLVED | Noted: 2022-01-28 | Resolved: 2023-01-11

## 2023-01-11 PROBLEM — U07.1 COVID: Status: ACTIVE | Noted: 2023-01-11

## 2023-01-11 PROBLEM — R73.03 PRE-DIABETES: Status: RESOLVED | Noted: 2022-08-18 | Resolved: 2023-01-11

## 2023-01-11 PROBLEM — S80.02XA HEMATOMA OF LEFT KNEE REGION: Status: RESOLVED | Noted: 2022-08-31 | Resolved: 2023-01-11

## 2023-01-11 PROBLEM — R35.0 FREQUENCY OF MICTURITION: Status: RESOLVED | Noted: 2022-03-06 | Resolved: 2023-01-11

## 2023-01-11 PROBLEM — R79.89 ELEVATED LFTS: Status: RESOLVED | Noted: 2022-09-14 | Resolved: 2023-01-11

## 2023-01-11 PROBLEM — L03.115 CELLULITIS OF RIGHT LOWER EXTREMITY: Status: ACTIVE | Noted: 2023-01-11

## 2023-01-11 PROBLEM — B96.1 KLEBSIELLA PNEUMONIAE (K. PNEUMONIAE) AS THE CAUSE OF DISEASES CLASSIFIED ELSEWHERE: Status: RESOLVED | Noted: 2022-01-28 | Resolved: 2023-01-11

## 2023-01-11 PROBLEM — Z86.16 PERSONAL HISTORY OF COVID-19: Status: RESOLVED | Noted: 2022-03-18 | Resolved: 2023-01-11

## 2023-01-11 PROBLEM — R39.9 SYMPTOMS INVOLVING URINARY SYSTEM: Status: RESOLVED | Noted: 2022-08-18 | Resolved: 2023-01-11

## 2023-01-11 PROBLEM — R17 JAUNDICE: Status: RESOLVED | Noted: 2022-09-28 | Resolved: 2023-01-11

## 2023-01-11 PROBLEM — R60.0 LOCALIZED EDEMA: Status: RESOLVED | Noted: 2022-06-16 | Resolved: 2023-01-11

## 2023-01-11 PROBLEM — E87.1 HYPO-OSMOLALITY AND HYPONATREMIA: Status: RESOLVED | Noted: 2022-01-28 | Resolved: 2023-01-11

## 2023-01-11 NOTE — PROGRESS NOTES
Subjective:     CC: COVID-19    HPI:  Michael Hermosillo is a [de-identified] y.o. female was seen today for COVID 19 evaluation and monthly visit. Patient has been COVID 19 positive since 12/16/22. They were seen with full PPE and observing continued droplet precautions. Condition discussed with nursing staff and treatment reviewed. Recent bloodwork, chest x-ray and vital signs reviewed. Fever curve and oxygen demands reviewed. Nutritional status and intake reviewed. Patient is not demonstrating increased respiratory complaints at this time. Patient has not been febrile in the last 24 hours. Patient is able to feed themselves. Patient is not demonstrating increased oxygen demand. 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 pneumoniae) as the cause of diseases classified elsewhere 1/28/2022    Non-traumatic rhabdomyolysis 8/26/2020    Nonspecific abnormal results of thyroid function study 8/5/2011    Last Assessment & Plan:  Formatting of this note might be different from the original. 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 No family history on file.   Social History     Socioeconomic History    Marital status:      Spouse name: Not on file    Number of children: Not on file    Years of education: Not on file    Highest education level: Not on file Occupational History    Not on file   Tobacco Use    Smoking status: Former    Smokeless tobacco: Former   Substance and Sexual Activity    Alcohol use: Not Currently    Drug use: Not on file    Sexual activity: Not on file   Other Topics Concern    Not on file   Social History Narrative    Not on file     Social Determinants of Health     Financial Resource Strain: Not on file   Food Insecurity: Not on file   Transportation Needs: Not on file   Physical Activity: Inactive    Days of Exercise per Week: 0 days    Minutes of Exercise per Session: 0 min   Stress: Not on file   Social Connections: Not on file   Intimate Partner Violence: Not on file   Housing Stability: Not on file     Current Outpatient Medications on File Prior to Visit   Medication Sig Dispense Refill    acetaminophen (TYLENOL) 325 MG tablet Take by mouth      insulin lispro, 1 Unit Dial, 100 UNIT/ML SOPN Inject 0-10 Units into the skin 3 times daily Indications: Type 2 Diabetes Inject as per sliding scale: -500=1y; 201-250=4u; 251-300=6u; 301-350=8u; 351-400=10u. Notify MD/NP if BS <60 or >400. Lactobacillus (ACIDOPHILUS) CAPS capsule Take 1 capsule by mouth in the morning. Indications: Nutritional Support. escitalopram (LEXAPRO) 10 MG tablet Take 10 mg by mouth in the morning. Indications: Depression. furosemide (LASIX) 20 MG tablet Take 20 mg by mouth in the morning. Indications: High Blood Pressure Disorder. melatonin 3 MG TABS tablet Take 3 mg by mouth nightly Indications: Trouble Sleeping      memantine (NAMENDA) 10 MG tablet Take 10 mg by mouth in the morning and 10 mg before bedtime. Indications: Decline in Cognition due to a Brain Disease. QUEtiapine (SEROQUEL) 25 MG tablet Take 12.5 mg by mouth in the morning. Indications: Behavioral Disorders associated with Dementia. telmisartan-hydroCHLOROthiazide (MICARDIS HCT) 80-12.5 MG per tablet Take 1 tablet by mouth in the morning.  Indications: High Blood Pressure Disorder. ondansetron (ZOFRAN-ODT) 4 MG disintegrating tablet Take 4 mg by mouth every 6 hours as needed for Nausea or Vomiting      carvedilol (COREG) 25 MG tablet Take 25 mg by mouth 2 times daily (with meals) Indications: High Blood Pressure Disorder      donepezil (ARICEPT) 10 MG tablet Take 10 mg by mouth nightly Indications: Decline in Cognition due to a Brain Disease      SITagliptin (JANUVIA) 100 MG tablet Take 100 mg by mouth daily Indications: Type 2 Diabetes      montelukast (SINGULAIR) 5 MG chewable tablet Take 5 mg by mouth nightly Indications: Asthma      cyanocobalamin 1000 MCG tablet Take 1,000 mcg by mouth daily Indications: Nutritional Support       No current facility-administered medications on file prior to visit. Review of Systems    Objective:     Physical Exam     .[facility]  Lab Results   Component Value Date    FERRITIN 292 (H) 12/06/2022     Lab Results   Component Value Date    WBC 12.6 (H) 01/08/2023    HGB 10.5 (L) 01/08/2023    HCT 31.7 (L) 01/08/2023    MCV 93.4 01/08/2023     01/08/2023     Lab Results   Component Value Date/Time     01/10/2023 10:10 AM    K 4.2 01/10/2023 10:10 AM     01/10/2023 10:10 AM    CO2 27 01/10/2023 10:10 AM    BUN 19 01/10/2023 10:10 AM    CREATININE 0.86 01/10/2023 10:10 AM    GLUCOSE 68 01/10/2023 10:10 AM    GLUCOSE 76 09/18/2022 07:00 AM    CALCIUM 8.7 01/10/2023 10:10 AM      No results found for: CKTOTAL, CKMB, CKMBINDEX, TROPONINI  No results found for: CRP   No results found for: DDIMER   No results found for: CKTOTAL    Please refer to on site chart for most recent chest Xray result, reviewed at this time. Assessment & Plan:     Continue with current regimen of  Vitamin C 500mg PO BID  Vitamin D 1000 IU PO QD  Zinc 50 mg PO QD      Patient does not require Dexamethasone 6mg PO QD  Patient does not  require antibiotics for concomitant bacterial pneumonia.       Continue to monitor blood work:  Weekly CBC, BMP.  Mood stable; no further decrease in cognition or increase in behaviors. Patient resides on dementia unit of this facility where she derives benefit. Continue Namenda as ordered. Keflex 250mg po q6h x5 days  Patient has not had any chest pain, chest discomfort or palpitations that she is aware of. Continue Eliquis as ordered. Return in about 1 month (around 1/16/2023), or if symptoms worsen or fail to improve, for chronic conditions. Patient's case discussed with nursing staff and Code Status reviewed. Please note orders entered on site at facility after discussion with appropriate facility nursing/therapy/ / nutritional staff. Current longstanding medical problems and acute medical issues addressed with staff. Available data and data elements in on site paper chart reviewed and analyzed. Current external consultant notes reviewed in on site chart. Ordered laboratory testing and imaging will be reviewed when available. Please note this report is partially produced by using speech recognition hardware. It may contain errors related to the system, including grammar, punctuation and spelling as well as words and phrases that may seem inaccurate.   For any questions or concerns feel free to contact me for clarification       Mel Rubinstein, APRN - CNP

## 2023-01-14 NOTE — PROGRESS NOTES
[facility]  [address]  [place], [address]  P: [phone]              2022    HPI:    Bruce Bro (:  1940) has requested an evaluation for the following concern(s):      Acute visit for CC of COVID-19. DX ON DATE 22  Pt/Nurse noted  they had no c/o  of upper respiratory symptoms. Patient tested positive on routine screening. Review of Systems  ROS: Nurse/pt reports   Constitutional: There are no new reports of behavioral issues,   Had no decrease in appetite, and no low grade fever, is not noted to be weaker. Had  no runny nose, sore throat, and  no cough. No c/o loss of taste or smell,  Respiratory: No c/o SOB, dyspnea, dyspnea on exertion, change in exercise capacity  Cardiovascular: denies CP, lightheadedness, palpitations, PND, orthopnea, or claudication. GI: No N/V/D. : no reports of dysuria  Extremities: No reports of pain issues, chronic edema. Patient continues to keep her extremities in dependent position. Prior to Visit Medications    Medication Sig Taking? Authorizing Provider   acetaminophen (TYLENOL) 325 MG tablet Take by mouth  Historical Provider, MD   insulin lispro, 1 Unit Dial, 100 UNIT/ML SOPN Inject 0-10 Units into the skin 3 times daily Indications: Type 2 Diabetes Inject as per sliding scale: -700=5t; 201-250=4u; 251-300=6u; 301-350=8u; 351-400=10u. Notify MD/NP if BS <60 or >400. Historical Provider, MD   Lactobacillus (ACIDOPHILUS) CAPS capsule Take 1 capsule by mouth in the morning. Indications: Nutritional Support. Historical Provider, MD   escitalopram (LEXAPRO) 10 MG tablet Take 10 mg by mouth in the morning. Indications: Depression. Historical Provider, MD   furosemide (LASIX) 20 MG tablet Take 20 mg by mouth in the morning. Indications: High Blood Pressure Disorder.   Historical Provider, MD   melatonin 3 MG TABS tablet Take 3 mg by mouth nightly Indications: [address] Provider, MD   [facility]) 10 MG tablet Take 10 mg by mouth in the morning and 10 mg before bedtime. Indications: Decline in Cognition due to a Brain Disease. Historical Provider, MD   QUEtiapine (SEROQUEL) 25 MG tablet Take 12.5 mg by mouth in the morning. Indications: Behavioral Disorders associated with Dementia. Historical Provider, MD   telmisartan-hydroCHLOROthiazide (MICARDIS HCT) 80-12.5 MG per tablet Take 1 tablet by mouth in the morning. Indications: High Blood Pressure Disorder.   Historical Provider, MD   ondansetron (ZOFRAN-ODT) 4 MG disintegrating tablet Take 4 mg by mouth every 6 hours as needed for Nausea or Vomiting  Historical Provider, MD   carvedilol (COREG) 25 MG tablet Take 25 mg by mouth 2 times daily (with meals) Indications: High Blood Pressure Disorder  Historical Provider, MD   donepezil (ARICEPT) 10 MG tablet Take 10 mg by mouth nightly Indications: Decline in Cognition due to a Brain Disease  Historical Provider, MD   SITagliptin (JANUVIA) 100 MG tablet Take 100 mg by mouth daily Indications: Type 2 Diabetes  Historical Provider, MD   montelukast (SINGULAIR) 5 MG chewable tablet Take 5 mg by mouth nightly Indications: Asthma  Historical Provider, MD   cyanocobalamin 1000 MCG tablet Take 1,000 mcg by mouth daily Indications: Nutritional Support  Historical Provider, MD       NF MEDICATIONS REVIEWED AND ALLERGIES REVIEWED IN NF CHART    Social History     Tobacco Use    Smoking status: Former    Smokeless tobacco: Former   Substance Use Topics    Alcohol use: Not Currently        No Known Allergies,   Past Medical History:   Diagnosis Date    Acquired absence of both cervix and uterus 1/28/2022    Hematoma of left knee region 8/31/2022    Hypo-osmolality and hyponatremia 1/28/2022    Hypokalemia 1/28/2022    Impaired glucose tolerance 12/22/2010    Jaundice 9/28/2022    Klebsiella pneumoniae (k. pneumoniae) as the cause of diseases classified elsewhere 1/28/2022    Non-traumatic rhabdomyolysis 8/26/2020    Nonspecific abnormal results of thyroid function study 8/5/2011    Last Assessment & Plan:  Formatting of this note might be different from the original. 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    PHYSICAL EXAMINATION:  Blood pressure- 127/72 Heart rate- 74 Respiratory rate- 18   Temperature- 98.1 Pulse oximetry- 96% on room air    Constitutional: [x] Appears well-developed and well-nourished [x] No apparent distress        Mental status  [x] Alert and awake  [x]Able to follow commands    She is PLEASANTLY CONFUSED AT BASELINE  Speech is clear, and appropriate      Eyes:  EOM    [x]  Normal  [] Abnormal-  Sclera  [x]  Normal  [] Abnormal -         Discharge [x]  None visible  [] Abnormal -    HENT:   [x] Normocephalic, atraumatic.  [] Abnormal   [x] Mouth/Throat: Mucous membranes are moist.     External Ears [x] Normal  [x] Abnormal- [place]asta    Neck: [x] No visualized mass     Pulmonary/Chest:   Heart sounds    RRR, no MGR noted  Lungs  sounds   LSCTA   [x] Respiratory effort normal.  [x] No visualized signs of difficulty breathing or respiratory distress             Musculoskeletal:           [x] Normal range of motion of neck  LIMITED ROM AT BASELINE     Neurological:        [x] No Facial Asymmetry (Cranial nerve 7 motor function)          [x] No gaze palsy        [] Abnormal-         Skin:        [x] No significant exanthematous lesions or discoloration noted on facial skin         [x] Abnormal- BLE with cellulitis, improved in appearance           Psychiatric:       [x] Normal Affect [x] No  Hallucinations          [x] Abnormal- onfused at baseline         Other pertinent observable physical exam findings- Chronic NP edema BLE    ASSESSMENT/PLAN:     Diagnosis Orders   1. COVID        2. Edema of both lower extremities            Standing orders for COVID -19 labs on diagnosis AND q week:  CBC  BMP    CXR ON DIAGNOSIS    medications:  Vit D3 1000 units po bid  VIt C 500mg po bid  Zinc 50mg po daily  Tolerating Keflex as ordered for BLE cellulitis  Ensure patient wearing ACE wraps/tubigrips BLE as ordered  Encourage patient to elevate legs when in chair/bed  Continue same meds and POC    Return in about 1 month (around 1/19/2023), or if symptoms worsen or fail to improve, for chronic conditions. Pt/POA agrees to Elizabeth 67, APRN - CNP on 1/14/2023 at 2:01 PM    Please note this report is partially produced by using speech recognition hardware. It may contain errors related to the system, including grammar, punctuation and spelling as well as words and phrases that may seem inaccurate.   For any questions or concerns feel free to contact me for clarification

## 2023-02-02 NOTE — PROGRESS NOTES
SUBJECTIVE:  82-year-old woman seen follow-up visit for dementia diabetes hypertension patient function stable. Patient recent nausea vomiting. Patient not recent change in her bowel bladder habits no recent psychosis this time patient did. Patient is able. Patient not recent change in her bowel bladder habits globally weak at this time pain      ROS: Limited by cognition  The rest of the 14 point ROS negative    PHYSICAL EXAM: VSS per facility record  Alert to self pupils are small oral mucosa moist chest showed no crackles or wheezing cardiovascular showed in a regular rate abdomen soft nontender extremity trace edema skin showed no new rash    ASSESSMENT & PLAN:   Diagnosis Orders   1. Moderate vascular dementia with agitation        2. Controlled type 2 diabetes mellitus without complication, with long-term current use of insulin ([address]        3. Essential hypertension            Is on memantine tolerating well. Has been donepezil tolerating well. Continue orientation patient is on quetiapine and wean as able. Is on beta-blocker has been diuresed patient is is a diabetic on insulin therapy no symptomatic hypoglycemia repeat A1c is pending.           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 pneumoniae) as the cause of diseases classified elsewhere 1/28/2022    Non-traumatic rhabdomyolysis 8/26/2020    Nonspecific abnormal results of thyroid function study 8/5/2011    Last Assessment & Plan:  Formatting of this note might be different from the original. 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 Current Outpatient Medications on File Prior to Visit   Medication Sig Dispense Refill    acetaminophen (TYLENOL) 325 MG tablet Take by mouth      insulin lispro, 1 Unit Dial, 100 UNIT/ML SOPN Inject 0-10 Units into the skin 3 times daily Indications: Type 2 Diabetes Inject as per sliding scale: -371=4y; 201-250=4u; 251-300=6u; 301-350=8u; 351-400=10u. Notify MD/NP if BS <60 or >400. Lactobacillus (ACIDOPHILUS) CAPS capsule Take 1 capsule by mouth in the morning. Indications: Nutritional Support. escitalopram (LEXAPRO) 10 MG tablet Take 10 mg by mouth in the morning. Indications: Depression. furosemide (LASIX) 20 MG tablet Take 20 mg by mouth in the morning. Indications: High Blood Pressure Disorder. melatonin 3 MG TABS tablet Take 3 mg by mouth nightly Indications: Trouble Sleeping      memantine (NAMENDA) 10 MG tablet Take 10 mg by mouth in the morning and 10 mg before bedtime. Indications: Decline in Cognition due to a Brain Disease. QUEtiapine (SEROQUEL) 25 MG tablet Take 12.5 mg by mouth in the morning. Indications: Behavioral Disorders associated with Dementia. telmisartan-hydroCHLOROthiazide (MICARDIS HCT) 80-12.5 MG per tablet Take 1 tablet by mouth in the morning. Indications: High Blood Pressure Disorder.       ondansetron (ZOFRAN-ODT) 4 MG disintegrating tablet Take 4 mg by mouth every 6 hours as needed for Nausea or Vomiting      carvedilol (COREG) 25 MG tablet Take 25 mg by mouth 2 times daily (with meals) Indications: High Blood Pressure Disorder      donepezil (ARICEPT) 10 MG tablet Take 10 mg by mouth nightly Indications: Decline in Cognition due to a Brain Disease      SITagliptin (JANUVIA) 100 MG tablet Take 100 mg by mouth daily Indications: Type 2 Diabetes      montelukast (SINGULAIR) 5 MG chewable tablet Take 5 mg by mouth nightly Indications: Asthma      cyanocobalamin 1000 MCG tablet Take 1,000 mcg by mouth daily Indications: Nutritional Support       No current facility-administered medications on file prior to visit. No family history on file.     Social History     Socioeconomic History    Marital status:      Spouse name: Not on file    Number of children: Not on file    Years of education: Not on file    Highest education level: Not on file   Occupational History    Not on file   Tobacco Use    Smoking status: Former    Smokeless tobacco: Former   Substance and Sexual Activity    Alcohol use: Not Currently    Drug use: Not on file    Sexual activity: Not on file   Other Topics Concern    Not on file   Social History Narrative    Not on file     Social Determinants of Health     Financial Resource Strain: Not on file   Food Insecurity: Not on file   Transportation Needs: Not on file   Physical Activity: Inactive    Days of Exercise per Week: 0 days    Minutes of Exercise per Session: 0 min   Stress: Not on file   Social Connections: Not on file   Intimate Partner Violence: Not on file   Housing Stability: Not on file         Lab Results   Component Value Date    LABA1C 4.4 (L) 08/12/2022     No results found for: EAG    Lab Results   Component Value Date/Time     01/10/2023 10:10 AM    K 4.2 01/10/2023 10:10 AM     01/10/2023 10:10 AM    CO2 27 01/10/2023 10:10 AM    BUN 19 01/10/2023 10:10 AM    CREATININE 0.86 01/10/2023 10:10 AM    GLUCOSE 68 01/10/2023 10:10 AM    GLUCOSE 76 09/18/2022 07:00 AM    CALCIUM 8.7 01/10/2023 10:10 AM        Lab Results   Component Value Date    CHOL 106 08/12/2022     Lab Results   Component Value Date    TRIG 97 08/12/2022     Lab Results   Component Value Date    HDL 17 (L) 08/12/2022     Lab Results   Component Value Date    LDLCALC 70 08/12/2022 No results found for: LABVLDL, VLDL  No results found for: [facility]    Lab Results   Component Value Date    TSH 2.260 08/12/2022       Lab Results   Component Value Date    WBC 12.6 (H) 01/08/2023    HGB 10.5 (L) 01/08/2023    HCT 31.7 (L) 01/08/2023    MCV 93.4 01/08/2023     01/08/2023       Please note orders entered on site at facility after discussion with appropriate facility nursing/therapy/ / nutritional staff. Current longstanding medical problems and acute medical issues addressed with staff. Available data and data elements in on site paper chart reviewed and analyzed. Current external consultant notes reviewed in on site chart. Ordered laboratory testing and imaging will be reviewed when available.

## 2023-02-09 LAB
ALBUMIN SERPL-MCNC: 3.1 G/DL (ref 3.5–4.6)
ALP BLD-CCNC: 44 U/L (ref 40–130)
ALT SERPL-CCNC: 6 U/L (ref 0–33)
ANION GAP SERPL CALCULATED.3IONS-SCNC: 11 MEQ/L (ref 9–15)
AST SERPL-CCNC: 13 U/L (ref 0–35)
BASOPHILS ABSOLUTE: 0 K/UL (ref 0–0.2)
BASOPHILS RELATIVE PERCENT: 0.5 %
BILIRUB SERPL-MCNC: 0.6 MG/DL (ref 0.2–0.7)
BUN BLDV-MCNC: 20 MG/DL (ref 8–23)
C-REACTIVE PROTEIN: <3 MG/L (ref 0–5)
CALCIUM SERPL-MCNC: 8.9 MG/DL (ref 8.5–9.9)
CHLORIDE BLD-SCNC: 109 MEQ/L (ref 95–107)
CO2: 21 MEQ/L (ref 20–31)
CREAT SERPL-MCNC: 1.02 MG/DL (ref 0.5–0.9)
EOSINOPHILS ABSOLUTE: 0.1 K/UL (ref 0–0.7)
EOSINOPHILS RELATIVE PERCENT: 1.1 %
GFR SERPL CREATININE-BSD FRML MDRD: 55.5 ML/MIN/{1.73_M2}
GLOBULIN: 3.4 G/DL (ref 2.3–3.5)
GLUCOSE BLD-MCNC: 89 MG/DL (ref 70–99)
HCT VFR BLD CALC: 36.6 % (ref 37–47)
HEMOGLOBIN: 12.3 G/DL (ref 12–16)
LYMPHOCYTES ABSOLUTE: 0.8 K/UL (ref 1–4.8)
LYMPHOCYTES RELATIVE PERCENT: 13.5 %
MCH RBC QN AUTO: 30.5 PG (ref 27–31.3)
MCHC RBC AUTO-ENTMCNC: 33.6 % (ref 33–37)
MCV RBC AUTO: 91 FL (ref 79.4–94.8)
MONOCYTES ABSOLUTE: 0.7 K/UL (ref 0.2–0.8)
MONOCYTES RELATIVE PERCENT: 11.3 %
NEUTROPHILS ABSOLUTE: 4.3 K/UL (ref 1.4–6.5)
NEUTROPHILS RELATIVE PERCENT: 73.6 %
PDW BLD-RTO: 16.4 % (ref 11.5–14.5)
PLATELET # BLD: 203 K/UL (ref 130–400)
POTASSIUM SERPL-SCNC: 4 MEQ/L (ref 3.4–4.9)
RBC # BLD: 4.02 M/UL (ref 4.2–5.4)
SODIUM BLD-SCNC: 141 MEQ/L (ref 135–144)
TOTAL PROTEIN: 6.5 G/DL (ref 6.3–8)
WBC # BLD: 5.9 K/UL (ref 4.8–10.8)

## 2023-02-10 ENCOUNTER — OFFICE VISIT (OUTPATIENT)
Dept: GERIATRIC MEDICINE | Age: 83
End: 2023-02-10

## 2023-02-10 DIAGNOSIS — N93.9 VAGINAL BLEEDING: Primary | ICD-10-CM

## 2023-02-17 ENCOUNTER — OFFICE VISIT (OUTPATIENT)
Dept: GERIATRIC MEDICINE | Age: 83
End: 2023-02-17
Payer: MEDICARE

## 2023-02-17 DIAGNOSIS — R13.10 DYSPHAGIA, UNSPECIFIED TYPE: Primary | ICD-10-CM

## 2023-02-17 DIAGNOSIS — R53.83 LETHARGY: ICD-10-CM

## 2023-02-17 LAB
HCT VFR BLD CALC: 37.3 % (ref 37–47)
HEMOGLOBIN: 12.5 G/DL (ref 12–16)
MCH RBC QN AUTO: 30.6 PG (ref 27–31.3)
MCHC RBC AUTO-ENTMCNC: 33.6 % (ref 33–37)
MCV RBC AUTO: 91.2 FL (ref 79.4–94.8)
PDW BLD-RTO: 16.4 % (ref 11.5–14.5)
PLATELET # BLD: 214 K/UL (ref 130–400)
RBC # BLD: 4.09 M/UL (ref 4.2–5.4)
WBC # BLD: 6.3 K/UL (ref 4.8–10.8)

## 2023-02-17 PROCEDURE — 99309 SBSQ NF CARE MODERATE MDM 30: CPT | Performed by: NURSE PRACTITIONER

## 2023-02-17 PROCEDURE — 1123F ACP DISCUSS/DSCN MKR DOCD: CPT | Performed by: NURSE PRACTITIONER

## 2023-02-17 PROCEDURE — G8484 FLU IMMUNIZE NO ADMIN: HCPCS | Performed by: NURSE PRACTITIONER

## 2023-02-18 LAB
IRON SATURATION: 18 % (ref 20–55)
IRON: 39 UG/DL (ref 37–145)
TOTAL IRON BINDING CAPACITY: 211 UG/DL (ref 250–450)
UNSATURATED IRON BINDING CAPACITY: 172 UG/DL (ref 112–347)

## 2023-02-22 ENCOUNTER — OFFICE VISIT (OUTPATIENT)
Dept: GERIATRIC MEDICINE | Age: 83
End: 2023-02-22

## 2023-02-22 DIAGNOSIS — Z71.0 DISCUSSION ABOUT ADVANCE CARE PLANNING HELD WITH FAMILY MEMBER: ICD-10-CM

## 2023-02-22 DIAGNOSIS — R62.7 FAILURE TO THRIVE IN ADULT: Primary | ICD-10-CM

## 2023-03-09 PROBLEM — N93.9 VAGINAL BLEEDING: Status: ACTIVE | Noted: 2023-03-09

## 2023-03-10 LAB — TSH SERPL DL<=0.05 MIU/L-ACNC: 0.18 UIU/ML (ref 0.44–3.86)

## 2023-03-10 NOTE — PROGRESS NOTES
[facility] [place]  2/10/2023    Jackelin Rosas  is a [de-identified] y.o. in the [place] being seen for a acute visit for   Chief Complaint   Patient presents with    Vaginal Bleeding       HPI Patient being seen today for acute visit for vaginal bleeding. Nursing staff report the aide reported that patient is having vaginal bleeding when they have been changing her. They are eating and drinking fair per nursing. They are not complaining of any un addressed pain issues. Have had no recent choking or dysphagia issues. 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 pneumoniae) as the cause of diseases classified elsewhere 1/28/2022    Non-traumatic rhabdomyolysis 8/26/2020    Nonspecific abnormal results of thyroid function study 8/5/2011    Last Assessment & Plan:  Formatting of this note might be different from the original. 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 No family history on file.   Social History     Socioeconomic History    Marital status:      Spouse name: Not on file    Number of children: Not on file    Years of education: Not on file    Highest education level: Not on file   Occupational History    Not on file   Tobacco Use    Smoking status: Former    Smokeless tobacco: Former   Substance and Sexual Activity    Alcohol use: Not Currently Drug use: Not on file    Sexual activity: Not on file   Other Topics Concern    Not on file   Social History Narrative    Not on file     Social Determinants of Health     Financial Resource Strain: Not on file   Food Insecurity: Not on file   Transportation Needs: Not on file   Physical Activity: Inactive    Days of Exercise per Week: 0 days    Minutes of Exercise per Session: 0 min   Stress: Not on file   Social Connections: Not on file   Intimate Partner Violence: Not on file   Housing Stability: Not on file       Allergies: Patient has no known allergies. NF MEDICATIONS REVIEWED    ROS:   Constitutional: There are no reports of behavioral issues, she has had change in appetite, no fever, or increased weakness. Respiratory: denies SOB, dyspnea, dyspnea on exertion  Cardiovascular: denies CP, lightheadedness, palpitations, PND, orthopnea, or claudication. GI: No N/V/D. : no reports of dysuria, frequency or urgency  Extremities: No reports of pain issues, edema  Psych: at baseline confusion     Physical exam:   /97, temperature 97.5, heart rate 88, respirations 18, spo2 96%, weight 168lbs. Constitutional: Awake and alert sitting up in bed, general weakness  HEENT: Normocephalic, [place],conjunctiva pink, sclera non icteric,  buccal mucosa pink and moist  Speech clear. NECK: - no cervical or clavicular lymphadenopathy, euthyroid, no mass visualized  Cardiovascular: Regular rate, and rhythm. No murmurs, gallops or rubs noted. Respiratory: LCTA, even unlabored respirations, no accessory muscle use noted  GI: abdomen NT, +BS x 4 Q, ND  : no suprapubic tenderness  Extremities: no ankle edema, PPP  SKELETAL: no redness, or swelling over joints. Limited ROM but spontaneous movement x 4   Psych: pleasantly confused, repetitive , needs re-directed, good judgement, normal thought content  SKIN: no gross skin lesions noted on visualized skin, warm and dry    ASSESSMENT:     Diagnosis Orders   1.  Vaginal bleeding            PLAN:  Pt/POA agrees with POC   Patient is on Eliquis which may be the causative factor causing her vaginal bleeding. She does have a history of endometrial cancer. I will have her follow-up for GYN MD if family in agreement to evaluate patient for cause of bleeding. Return in about 1 month (around 3/10/2023), or if symptoms worsen or fail to improve, for chronic conditions. Please note this report is partially produced by using speech recognition hardware. It may contain errors related to the system, including grammar, punctuation and spelling as well as words and phrases that may seem inaccurate.   For any questions or concerns feel free to contact me for clarification       Radha Mccallum, RHONDA - CNP

## 2023-03-13 LAB
T3 TOTAL: 55 NG/DL (ref 60–181)
T4 FREE: 1.38 NG/DL (ref 0.84–1.68)

## 2023-03-14 PROBLEM — R13.10 DYSPHAGIA: Status: ACTIVE | Noted: 2023-01-01

## 2023-03-14 PROBLEM — R53.83 LETHARGY: Status: ACTIVE | Noted: 2023-03-14

## 2023-03-15 NOTE — PROGRESS NOTES
[facility] [place]  2/17/2023    Mik Stephens  is a [de-identified] y.o. in the [place] being seen for a acute visit for   Chief Complaint   Patient presents with    Dysphagia    Fatigue       HPI Patient being seen today for dysphagia and lethargy. Nursing staff report patient is somewhat lethargic today. Nursing staff report she ate her dinner well with assistance but began pocketing her food toward the end of her meal. They are not complaining of any un addressed pain issues. 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 pneumoniae) as the cause of diseases classified elsewhere 1/28/2022    Non-traumatic rhabdomyolysis 8/26/2020    Nonspecific abnormal results of thyroid function study 8/5/2011    Last Assessment & Plan:  Formatting of this note might be different from the original. Assessment:  Transferred to [facility] from 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 No family history on file.   Social History     Socioeconomic History    Marital status:      Spouse name: Not on file    Number of children: Not on file    Years of education: Not on file    Highest education level: Not on file   Occupational History    Not on file   Tobacco Use    Smoking status: Former    Smokeless tobacco: Former   Substance and Sexual Activity    Alcohol use: Not Currently    Drug use: Not on file    Sexual activity: Not on file   Other Topics Concern    Not on file   Social History Narrative    Not on file     Social Determinants of Health     Financial Resource Strain: Not on file   Food Insecurity: Not on file   Transportation Needs: Not on file   Physical Activity: Inactive    Days of Exercise per Week: 0 days    Minutes of Exercise per Session: 0 min   Stress: Not on file   Social Connections: Not on file   Intimate Partner Violence: Not on file   Housing Stability: Not on file       Allergies: Patient has no known allergies. NF MEDICATIONS REVIEWED    ROS:   Constitutional: There are no reports of behavioral issues, change in appetite, fever, or increased weakness. No bleeding issues. Patient pocketing her food at the end of her meal per nurse aide. Lethargic today per nursing staff. Respiratory: denies SOB, dyspnea, dyspnea on exertion  Cardiovascular: denies CP, lightheadedness, palpitations, PND, orthopnea, or claudication. GI: No N/V/D. : no reports of dysuria, frequency or urgency  Extremities: No reports of pain issues, edema  Psych: at baseline confusion     Physical exam:   /97, temperature 97.5, heart rate 88, respirations 18, spo2 96% on room air, weight 161.2lbs  Constitutional: Sleeping but arousable sitting up in bed, general weakness  HEENT: Normocephalic, [place],conjunctiva pink, sclera non icteric,  buccal mucosa pink and moist  Speech clear. NECK: - no cervical or clavicular lymphadenopathy, euthyroid, no mass visualized  Cardiovascular: Regular rate, and rhythm. No murmurs, gallops or rubs noted. Respiratory: LCTA, even unlabored respirations, no accessory muscle use noted  GI: abdomen NT, +BS x 4 Q, ND  : no suprapubic tenderness  Extremities: no ankle edema, PPP  SKELETAL: no redness, or swelling over joints.  Limited ROM but spontaneous movement x 4   Psych: pleasantly confused, repetitive , needs re-directed  SKIN: no gross skin lesions noted on visualized skin, warm and dry    ASSESSMENT:     Diagnosis Orders   1. Dysphagia, unspecified type        2. Lethargy            PLAN:  Pt/POA agrees with POC   ST eval and treat for most appropriate diet  UA, C&S-may straight cath to obtain    Return in about 1 month (around 3/17/2023), or if symptoms worsen or fail to improve. Please note this report is partially produced by using speech recognition hardware. It may contain errors related to the system, including grammar, punctuation and spelling as well as words and phrases that may seem inaccurate.   For any questions or concerns feel free to contact me for clarification       Gely Gardner, RHONDA - CNP

## 2023-03-19 PROBLEM — Z71.0 DISCUSSION ABOUT ADVANCE CARE PLANNING HELD WITH FAMILY MEMBER: Status: ACTIVE | Noted: 2023-01-01

## 2023-03-19 PROBLEM — R62.7 FAILURE TO THRIVE IN ADULT: Status: ACTIVE | Noted: 2023-01-01

## 2023-03-19 NOTE — PROGRESS NOTES
[facility] [place]  2/22/2023    Haylee Tejeda  is a [de-identified] y.o. in the  being seen for a acute visit for   Chief Complaint   Patient presents with    Failure To Thrive       HPI patient being seen today for acute visit for adult failure to thrive. They are not eating and drinking well per nursing. Nursing staff report that patients intake is hit and miss. Nurse reports patient usually does good for breakfast, the  helps patient with lunch, and then she is hit and miss on dinner. They have had no recent falls with injuries. They are not complaining of any un addressed pain issues. Have had no recent choking or dysphagia issues. NO agitation or unusual mental behavioral issues. 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 pneumoniae) as the cause of diseases classified elsewhere 1/28/2022    Non-traumatic rhabdomyolysis 8/26/2020    Nonspecific abnormal results of thyroid function study 8/5/2011    Last Assessment & Plan:  Formatting of this note might be different from the original. Assessment:  Transferred to [facility] from 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 No family history on file.   Social History     Socioeconomic History    Marital status:      Spouse name: Not on file    Number of children: Not on file    Years of education: Not on file    Highest education level: Not on file   Occupational History    Not on file   Tobacco Use    Smoking status: Former    Smokeless tobacco: Former   Substance and Sexual Activity    Alcohol use: Not Currently    Drug use: Not on file    Sexual activity: Not on file   Other Topics Concern    Not on file   Social History Narrative    Not on file     Social Determinants of Health     Financial Resource Strain: Not on file   Food Insecurity: Not on file   Transportation Needs: Not on file   Physical Activity: Inactive    Days of Exercise per Week: 0 days    Minutes of Exercise per Session: 0 min   Stress: Not on file   Social Connections: Not on file   Intimate Partner Violence: Not on file   Housing Stability: Not on file       Allergies: Patient has no known allergies. NF MEDICATIONS REVIEWED    ROS:   Constitutional: There are no reports of behavioral issues, change in appetite, fever, or increased weakness. No bleeding issues. Respiratory: denies SOB, dyspnea, dyspnea on exertion  Cardiovascular: denies CP, lightheadedness, palpitations, PND, orthopnea, or claudication. GI: No N/V/D. : no reports of dysuria, frequency or urgency  Extremities: No reports of pain issues, edema  Psych: at baseline confusion     Physical exam:   Constitutional: Sleeping but arousable, laying in bed at the time of my visit, general weakness  HEENT: Normocephalic, [place],conjunctiva pink, sclera non icteric,  buccal mucosa pink and moist  Patient nonverbal.   NECK: - no cervical or clavicular lymphadenopathy, euthyroid, no mass visualized  Cardiovascular: Regular rate, and rhythm. No murmurs, gallops or rubs noted. Respiratory: LCTA, even unlabored respirations, no accessory muscle use noted  GI: abdomen NT, +BS x 4 Q, ND  : no suprapubic tenderness  Extremities: no ankle edema, PPP  SKELETAL: no redness, or swelling over joints.  Limited ROM but spontaneous movement x 4   Psych: pleasantly confused, stares at me when I ask questions, nonverbal  SKIN: no gross skin lesions noted on visualized skin, warm and dry    ASSESSMENT:     Diagnosis Orders   1. Failure to thrive in adult            PLAN:  Pt/POA agrees with POC   Contacted POA daughter to discuss patient's current condition per her patient's  request.  Jayde Azul patient's daughter that patient has had an abnormal weight loss of 37.2 pounds. I have advised her that patient's intake is hit and miss. I have advised her that  reports patient no longer engages in conversation with him and just stares at the TV. I did recommend palliative care versus hospice services. Daughter reports that they are not ready for hospice yet but she is willing to speak with palliative care to see what services they can offer her mother. I also have advised daughter I will do a UA C&S to ensure there is not a medical reason for her lack of p.o. intake and weight loss. I did discuss with the daughter that patient has had insidious decline in her weight since her fall in assisted living. Then with her hospitalization following that with her becoming jaundice. As well as her recent bout with COVID I feel is contributing to her insidious decline in weight. I also advised the daughter that I will have speech therapy eval and treat the patient to ensure that she is not having a swallowing issue that is contributing to her poor p.o. intake. Patient's daughter was in agreement with plan of care and was appreciative of phone call and update. I also discussed patient's CODE STATUS with patient POA and she would like to change her CODE STATUS to DNR comfort care. Advance Directive  Patient has a documented healthcare surrogate  Discussed with: Family member and  Ramsey Galindo. Spouse referred me to speak with his daughter Madisyn Castro. Patient's Thanh Pancake, would like patient to be a [facility]. Return in about 1 month (around 3/22/2023), or if symptoms worsen or fail to improve, for chronic conditions.   Please note this report is partially produced by using speech recognition hardware. It may contain errors related to the system, including grammar, punctuation and spelling as well as words and phrases that may seem inaccurate.   For any questions or concerns feel free to contact me for clarification     Velta Lanes, RHONDA - CNP

## 2023-03-29 ENCOUNTER — TELEPHONE (OUTPATIENT)
Dept: NEUROLOGY | Age: 83
End: 2023-03-29

## 2023-03-29 NOTE — TELEPHONE ENCOUNTER
Isai message from pts daughter:    Elina Robles,     My mom Te Joshi), has an appointment on April 4th. My mom has become mostly non-verbal in the last several months and has been wheel chair bound. I know she has an appointment coming up and I just want to make sure that it still makes sense for her to come given her current state. If you, or someone from your staff can please let me know, I would appreciate it. thanks,  Holden Gutierrez  [phone]    Please advise if you think they should keep appt or not and as far as I can see you do not prescribe any medications at this time.

## 2023-04-04 LAB — VALPROATE SERPL-MCNC: 67.5 UG/ML (ref 50–100)

## 2023-04-10 PROBLEM — R63.0 ANOREXIA: Status: ACTIVE | Noted: 2023-01-01

## 2023-04-11 ENCOUNTER — OFFICE VISIT (OUTPATIENT)
Dept: GERIATRIC MEDICINE | Age: 83
End: 2023-04-11
Payer: MEDICARE

## 2023-04-11 DIAGNOSIS — R62.7 FAILURE TO THRIVE IN ADULT: Primary | ICD-10-CM

## 2023-04-11 PROCEDURE — 99310 SBSQ NF CARE HIGH MDM 45: CPT | Performed by: NURSE PRACTITIONER

## 2023-04-11 PROCEDURE — G9692 HOSP RECD BY PT DUR MSMT PER: HCPCS | Performed by: NURSE PRACTITIONER

## 2023-04-19 PROBLEM — Z51.5 HOSPICE CARE: Status: ACTIVE | Noted: 2023-01-01

## 2023-04-26 NOTE — PROGRESS NOTES
[facility] [place]  4/11/2023    Dawood Guerrero  is a [de-identified] y.o. in the [place] being seen for a acute visit for   Chief Complaint   Patient presents with    Other     FTT       HPI Patient being seen today for acute visit for adult FTT. They are eating and drinking poorly per nursing. Nurse reports that the patient has been refusing medications intermittently. They are not complaining of any un addressed pain issues. Have had no recent choking or dysphagia issues. 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 pneumoniae) as the cause of diseases classified elsewhere 1/28/2022    Non-traumatic rhabdomyolysis 8/26/2020    Nonspecific abnormal results of thyroid function study 8/5/2011    Last Assessment & Plan:  Formatting of this note might be different from the original. 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 No family history on file.   Social History     Socioeconomic History    Marital status:      Spouse name: Not on file    Number of children: Not on file    Years of education: Not on file    Highest education level: Not on file   Occupational History    Not on file   Tobacco Use    Smoking status: Former    Smokeless tobacco: Former   Substance and Sexual Activity    Alcohol use: Not Currently    Drug use: Not on file    Sexual activity: Not

## 2023-05-05 NOTE — PROGRESS NOTES
nightly Indications: Asthma      cyanocobalamin 1000 MCG tablet Take 1,000 mcg by mouth daily Indications: Nutritional Support       No current facility-administered medications on file prior to visit. No family history on file.     Social History     Socioeconomic History    Marital status:      Spouse name: Not on file    Number of children: Not on file    Years of education: Not on file    Highest education level: Not on file   Occupational History    Not on file   Tobacco Use    Smoking status: Former    Smokeless tobacco: Former   Substance and Sexual Activity    Alcohol use: Not Currently    Drug use: Not on file    Sexual activity: Not on file   Other Topics Concern    Not on file   Social History Narrative    Not on file     Social Determinants of Health     Financial Resource Strain: Not on file   Food Insecurity: Not on file   Transportation Needs: Not on file   Physical Activity: Inactive    Days of Exercise per Week: 0 days    Minutes of Exercise per Session: 0 min   Stress: Not on file   Social Connections: Not on file   Intimate Partner Violence: Not on file   Housing Stability: Not on file         Lab Results   Component Value Date    LABA1C 4.4 (L) 08/12/2022     No results found for: EAG    Lab Results   Component Value Date/Time     03/28/2023 06:47 PM    K 3.4 03/28/2023 06:47 PM     03/28/2023 06:47 PM    CO2 21 02/09/2023 09:53 AM    BUN 20 02/09/2023 09:53 AM    CREATININE 1.09 03/28/2023 06:47 PM    GLUCOSE 81 03/28/2023 06:47 PM    CALCIUM 9.0 03/28/2023 06:47 PM        Lab Results   Component Value Date    CHOL 106 08/12/2022     Lab Results   Component Value Date    TRIG 97 08/12/2022     Lab Results   Component Value Date    HDL 17 (L) 08/12/2022     Lab Results   Component Value Date    LDLCALC 70 08/12/2022     No results found for: LABVLDL, VLDL  No results found for: [facility]    Lab Results   Component Value Date    TSH 0.183 (L) 03/10/2023       Lab

## 2023-05-13 NOTE — PROGRESS NOTES
elements in on site paper chart reviewed and analyzed. Current external consultant notes reviewed in on site chart. Ordered laboratory testing and imaging will be reviewed when available.